# Patient Record
Sex: MALE | Race: WHITE | NOT HISPANIC OR LATINO | Employment: FULL TIME | URBAN - METROPOLITAN AREA
[De-identification: names, ages, dates, MRNs, and addresses within clinical notes are randomized per-mention and may not be internally consistent; named-entity substitution may affect disease eponyms.]

---

## 2022-07-05 ENCOUNTER — OFFICE VISIT (OUTPATIENT)
Dept: FAMILY MEDICINE | Facility: CLINIC | Age: 32
End: 2022-07-05
Payer: COMMERCIAL

## 2022-07-05 VITALS
BODY MASS INDEX: 25.94 KG/M2 | DIASTOLIC BLOOD PRESSURE: 72 MMHG | HEART RATE: 76 BPM | SYSTOLIC BLOOD PRESSURE: 118 MMHG | OXYGEN SATURATION: 98 % | TEMPERATURE: 99 F | HEIGHT: 66 IN | WEIGHT: 161.38 LBS

## 2022-07-05 DIAGNOSIS — L03.115 CELLULITIS OF RIGHT LOWER EXTREMITY: ICD-10-CM

## 2022-07-05 DIAGNOSIS — L03.116 CELLULITIS OF LEFT LOWER EXTREMITY: ICD-10-CM

## 2022-07-05 DIAGNOSIS — L25.5 CONTACT DERMATITIS DUE TO PLANT: Primary | ICD-10-CM

## 2022-07-05 PROCEDURE — 1159F PR MEDICATION LIST DOCUMENTED IN MEDICAL RECORD: ICD-10-PCS | Mod: CPTII,S$GLB,, | Performed by: FAMILY MEDICINE

## 2022-07-05 PROCEDURE — 3074F PR MOST RECENT SYSTOLIC BLOOD PRESSURE < 130 MM HG: ICD-10-PCS | Mod: CPTII,S$GLB,, | Performed by: FAMILY MEDICINE

## 2022-07-05 PROCEDURE — 1160F RVW MEDS BY RX/DR IN RCRD: CPT | Mod: CPTII,S$GLB,, | Performed by: FAMILY MEDICINE

## 2022-07-05 PROCEDURE — 3008F PR BODY MASS INDEX (BMI) DOCUMENTED: ICD-10-PCS | Mod: CPTII,S$GLB,, | Performed by: FAMILY MEDICINE

## 2022-07-05 PROCEDURE — 3008F BODY MASS INDEX DOCD: CPT | Mod: CPTII,S$GLB,, | Performed by: FAMILY MEDICINE

## 2022-07-05 PROCEDURE — 99203 OFFICE O/P NEW LOW 30 MIN: CPT | Mod: S$GLB,,, | Performed by: FAMILY MEDICINE

## 2022-07-05 PROCEDURE — 99999 PR PBB SHADOW E&M-NEW PATIENT-LVL III: ICD-10-PCS | Mod: PBBFAC,,, | Performed by: FAMILY MEDICINE

## 2022-07-05 PROCEDURE — 99999 PR PBB SHADOW E&M-NEW PATIENT-LVL III: CPT | Mod: PBBFAC,,, | Performed by: FAMILY MEDICINE

## 2022-07-05 PROCEDURE — 3078F DIAST BP <80 MM HG: CPT | Mod: CPTII,S$GLB,, | Performed by: FAMILY MEDICINE

## 2022-07-05 PROCEDURE — 1159F MED LIST DOCD IN RCRD: CPT | Mod: CPTII,S$GLB,, | Performed by: FAMILY MEDICINE

## 2022-07-05 PROCEDURE — 3074F SYST BP LT 130 MM HG: CPT | Mod: CPTII,S$GLB,, | Performed by: FAMILY MEDICINE

## 2022-07-05 PROCEDURE — 99203 PR OFFICE/OUTPT VISIT, NEW, LEVL III, 30-44 MIN: ICD-10-PCS | Mod: S$GLB,,, | Performed by: FAMILY MEDICINE

## 2022-07-05 PROCEDURE — 3078F PR MOST RECENT DIASTOLIC BLOOD PRESSURE < 80 MM HG: ICD-10-PCS | Mod: CPTII,S$GLB,, | Performed by: FAMILY MEDICINE

## 2022-07-05 PROCEDURE — 1160F PR REVIEW ALL MEDS BY PRESCRIBER/CLIN PHARMACIST DOCUMENTED: ICD-10-PCS | Mod: CPTII,S$GLB,, | Performed by: FAMILY MEDICINE

## 2022-07-05 RX ORDER — DOXYCYCLINE 100 MG/1
100 CAPSULE ORAL 2 TIMES DAILY
Qty: 20 CAPSULE | Refills: 0 | Status: SHIPPED | OUTPATIENT
Start: 2022-07-05 | End: 2023-08-08

## 2022-07-05 RX ORDER — METHYLPREDNISOLONE 4 MG/1
TABLET ORAL
Qty: 1 EACH | Refills: 0 | Status: SHIPPED | OUTPATIENT
Start: 2022-07-05 | End: 2023-08-08

## 2022-07-05 NOTE — PROGRESS NOTES
Subjective:       Patient ID: Grover Abbasi III is a 32 y.o. male.    Chief Complaint: No chief complaint on file.    New patient here for UC visit.  CC- infection on both lower legs.  2 weeks ago started w poison ivy; seemed to be improving and drying.  Approx 5 days ago he noticed more redness and it has become sore and thickened.   Heat exposure yesterday when riding 4 jones seemed to aggravate the soreness.  No fever or nausea.      PMSFHx unremarkable except for hearing loss in his mother who works here at [employer].    Non-smoker    Review of Systems   Constitutional: Negative for fever.   Respiratory: Negative for shortness of breath.    Cardiovascular: Negative for chest pain.   Gastrointestinal: Negative for abdominal pain and nausea.   Skin: Positive for rash.   Neurological: Negative for numbness.   All other systems reviewed and are negative.      Objective:      Physical Exam  Constitutional:       General: He is not in acute distress.     Appearance: He is well-developed.   Cardiovascular:      Rate and Rhythm: Normal rate and regular rhythm.      Heart sounds: No murmur heard.  Pulmonary:      Effort: Pulmonary effort is normal.      Breath sounds: Normal breath sounds.   Musculoskeletal:      Cervical back: Neck supple.   Lymphadenopathy:      Cervical: No cervical adenopathy.   Skin:     General: Skin is warm and dry.      Findings: Rash present.      Comments: Diffuse, patchy large areas of erythema on both lower legs with superimposed dry lesions c/w contact dermatitis.  Erythematous areas have some mild induration in some areas and increased warmth.           Assessment:       1. Contact dermatitis due to plant    2. Cellulitis of right lower extremity    3. Cellulitis of left lower extremity        Plan:       Contact dermatitis due to plant  -     methylPREDNISolone (MEDROL DOSEPACK) 4 mg tablet; use as directed  Dispense: 1 each; Refill: 0    Cellulitis of right lower extremity  -      doxycycline (MONODOX) 100 MG capsule; Take 1 capsule (100 mg total) by mouth 2 (two) times daily.  Dispense: 20 capsule; Refill: 0    Cellulitis of left lower extremity  -     doxycycline (MONODOX) 100 MG capsule; Take 1 capsule (100 mg total) by mouth 2 (two) times daily.  Dispense: 20 capsule; Refill: 0

## 2023-08-08 ENCOUNTER — OFFICE VISIT (OUTPATIENT)
Dept: FAMILY MEDICINE | Facility: CLINIC | Age: 33
End: 2023-08-08
Payer: COMMERCIAL

## 2023-08-08 VITALS
SYSTOLIC BLOOD PRESSURE: 110 MMHG | HEART RATE: 77 BPM | WEIGHT: 157.44 LBS | DIASTOLIC BLOOD PRESSURE: 78 MMHG | TEMPERATURE: 99 F | BODY MASS INDEX: 25.3 KG/M2 | HEIGHT: 66 IN | OXYGEN SATURATION: 98 %

## 2023-08-08 DIAGNOSIS — J32.9 SINUSITIS, UNSPECIFIED CHRONICITY, UNSPECIFIED LOCATION: Primary | ICD-10-CM

## 2023-08-08 DIAGNOSIS — T70.0XXA OTITIC BAROTRAUMA, INITIAL ENCOUNTER: ICD-10-CM

## 2023-08-08 PROCEDURE — 1159F PR MEDICATION LIST DOCUMENTED IN MEDICAL RECORD: ICD-10-PCS | Mod: CPTII,S$GLB,, | Performed by: NURSE PRACTITIONER

## 2023-08-08 PROCEDURE — 3008F BODY MASS INDEX DOCD: CPT | Mod: CPTII,S$GLB,, | Performed by: NURSE PRACTITIONER

## 2023-08-08 PROCEDURE — 3078F PR MOST RECENT DIASTOLIC BLOOD PRESSURE < 80 MM HG: ICD-10-PCS | Mod: CPTII,S$GLB,, | Performed by: NURSE PRACTITIONER

## 2023-08-08 PROCEDURE — 99999 PR PBB SHADOW E&M-EST. PATIENT-LVL IV: ICD-10-PCS | Mod: PBBFAC,,, | Performed by: NURSE PRACTITIONER

## 2023-08-08 PROCEDURE — 3074F SYST BP LT 130 MM HG: CPT | Mod: CPTII,S$GLB,, | Performed by: NURSE PRACTITIONER

## 2023-08-08 PROCEDURE — 3008F PR BODY MASS INDEX (BMI) DOCUMENTED: ICD-10-PCS | Mod: CPTII,S$GLB,, | Performed by: NURSE PRACTITIONER

## 2023-08-08 PROCEDURE — 3078F DIAST BP <80 MM HG: CPT | Mod: CPTII,S$GLB,, | Performed by: NURSE PRACTITIONER

## 2023-08-08 PROCEDURE — 1159F MED LIST DOCD IN RCRD: CPT | Mod: CPTII,S$GLB,, | Performed by: NURSE PRACTITIONER

## 2023-08-08 PROCEDURE — 1160F PR REVIEW ALL MEDS BY PRESCRIBER/CLIN PHARMACIST DOCUMENTED: ICD-10-PCS | Mod: CPTII,S$GLB,, | Performed by: NURSE PRACTITIONER

## 2023-08-08 PROCEDURE — 3074F PR MOST RECENT SYSTOLIC BLOOD PRESSURE < 130 MM HG: ICD-10-PCS | Mod: CPTII,S$GLB,, | Performed by: NURSE PRACTITIONER

## 2023-08-08 PROCEDURE — 87635 SARS-COV-2 COVID-19 AMP PRB: CPT | Performed by: NURSE PRACTITIONER

## 2023-08-08 PROCEDURE — 99213 OFFICE O/P EST LOW 20 MIN: CPT | Mod: S$GLB,,, | Performed by: NURSE PRACTITIONER

## 2023-08-08 PROCEDURE — 99999 PR PBB SHADOW E&M-EST. PATIENT-LVL IV: CPT | Mod: PBBFAC,,, | Performed by: NURSE PRACTITIONER

## 2023-08-08 PROCEDURE — 99213 PR OFFICE/OUTPT VISIT, EST, LEVL III, 20-29 MIN: ICD-10-PCS | Mod: S$GLB,,, | Performed by: NURSE PRACTITIONER

## 2023-08-08 PROCEDURE — 1160F RVW MEDS BY RX/DR IN RCRD: CPT | Mod: CPTII,S$GLB,, | Performed by: NURSE PRACTITIONER

## 2023-08-08 RX ORDER — LORATADINE 10 MG/1
10 TABLET ORAL DAILY PRN
Qty: 30 TABLET | Refills: 1 | Status: SHIPPED | OUTPATIENT
Start: 2023-08-08 | End: 2024-03-12

## 2023-08-08 RX ORDER — METHYLPREDNISOLONE 4 MG/1
TABLET ORAL
Qty: 21 EACH | Refills: 0 | Status: SHIPPED | OUTPATIENT
Start: 2023-08-08 | End: 2023-08-29

## 2023-08-08 RX ORDER — DOXYCYCLINE 100 MG/1
100 CAPSULE ORAL 2 TIMES DAILY
Qty: 14 CAPSULE | Refills: 0 | Status: SHIPPED | OUTPATIENT
Start: 2023-08-08 | End: 2023-08-15

## 2023-08-08 NOTE — PROGRESS NOTES
"Subjective:       Patient ID: Grover Abbasi III is a 33 y.o. male.    Chief Complaint: Sinus Problem and pain in ears     HPI   34 y/o male patient with medical problems listed below presents for lingering sinus symptoms of sinus pressure, nasal congestion, clear rhinorrhea, postnasal dripping since last Friday. States he flew to [place] and returned back to [place] this Sunday. He also states has had b/l ear pressure but left ear pressure resolved on landing but still has constant right ear pressure but denies ear pain or discharges, fever, chills. Denies cough, sore throat, chest pain, sob, nausea, abdominal pain, generalized body ache.     There is no problem list on file for this patient.       Review of patient's allergies indicates:   Allergen Reactions    Amoxicillin        No past surgical history on file.       Current Outpatient Medications:     doxycycline (VIBRAMYCIN) 100 MG Cap, Take 1 capsule (100 mg total) by mouth 2 (two) times daily. for 7 days, Disp: 14 capsule, Rfl: 0    loratadine (CLARITIN) 10 mg tablet, Take 1 tablet (10 mg total) by mouth daily as needed for Allergies., Disp: 30 tablet, Rfl: 1    methylPREDNISolone (MEDROL DOSEPACK) 4 mg tablet, use as directed, Disp: 21 each, Rfl: 0    No results found for: "WBC", "HGB", "HCT", "PLT", "CHOL", "TRIG", "HDL", "LDLDIRECT", "ALT", "AST", "NA", "K", "CL", "CREATININE", "BUN", "CO2", "TSH", "PSA", "INR", "GLUF", "HGBA1C", "MICROALBUR"    Review of Systems   Constitutional:  Negative for chills and fever.   HENT:  Positive for congestion, postnasal drip, rhinorrhea and sinus pressure. Negative for ear discharge and sore throat.    Respiratory:  Negative for cough, chest tightness and shortness of breath.    Cardiovascular:  Negative for chest pain and palpitations.   Gastrointestinal:  Negative for abdominal pain.   Neurological:  Negative for dizziness and headaches.       Objective:   /78 (BP Location: Right arm, Patient Position: Sitting, BP " "Method: Medium (Manual))   Pulse 77   Temp 98.9 °F (37.2 °C) (Oral)   Ht 5' 6" (1.676 m)   Wt 71.4 kg (157 lb 6.5 oz)   SpO2 98%   BMI 25.41 kg/m²         Physical Exam  Vitals reviewed.   Constitutional:       General: He is not in acute distress.     Appearance: Normal appearance.   HENT:      Head:      Comments: +tenderness in b/l maxillary sinus      Right Ear: Tympanic membrane normal.      Left Ear: Tympanic membrane normal.      Nose: Congestion and rhinorrhea present.   Cardiovascular:      Rate and Rhythm: Normal rate and regular rhythm.      Pulses: Normal pulses.      Heart sounds: Normal heart sounds.   Chest:      Chest wall: No deformity, swelling, tenderness or edema.   Abdominal:      General: Abdomen is flat. Bowel sounds are normal.      Palpations: Abdomen is soft.   Musculoskeletal:      Cervical back: Normal range of motion.   Skin:     General: Skin is warm and dry.   Neurological:      General: No focal deficit present.      Mental Status: He is alert.   Psychiatric:         Mood and Affect: Mood normal.         Behavior: Behavior normal.         Assessment:       1. Sinusitis, unspecified chronicity, unspecified location    2. Otitic barotrauma, initial encounter        Plan:       1. Sinusitis, unspecified chronicity, unspecified location  - COVID-19 Routine Screening  - Patient is allergic to Penicillin  - doxycycline (VIBRAMYCIN) 100 MG Cap; Take 1 capsule (100 mg total) by mouth 2 (two) times daily. for 7 days  Dispense: 14 capsule; Refill: 0  - methylPREDNISolone (MEDROL DOSEPACK) 4 mg tablet; use as directed  Dispense: 21 each; Refill: 0    2. Otitic barotrauma, initial encounter  - loratadine (CLARITIN) 10 mg tablet; Take 1 tablet (10 mg total) by mouth daily as needed for Allergies.  Dispense: 30 tablet; Refill: 1  - Advised to see ENT if no improvement or worsening   - Ambulatory referral/consult to ENT; Future    Patient with be reevaluated in  as needed  or sooner sean Orourke " NP

## 2023-08-09 LAB — SARS-COV-2 RNA RESP QL NAA+PROBE: DETECTED

## 2023-09-07 ENCOUNTER — TELEPHONE (OUTPATIENT)
Dept: FAMILY MEDICINE | Facility: CLINIC | Age: 33
End: 2023-09-07
Payer: COMMERCIAL

## 2023-10-27 ENCOUNTER — OFFICE VISIT (OUTPATIENT)
Dept: URGENT CARE | Facility: CLINIC | Age: 33
End: 2023-10-27
Payer: OTHER MISCELLANEOUS

## 2023-10-27 VITALS
WEIGHT: 155 LBS | HEIGHT: 65 IN | OXYGEN SATURATION: 98 % | TEMPERATURE: 99 F | DIASTOLIC BLOOD PRESSURE: 78 MMHG | BODY MASS INDEX: 25.83 KG/M2 | HEART RATE: 92 BPM | SYSTOLIC BLOOD PRESSURE: 128 MMHG | RESPIRATION RATE: 18 BRPM

## 2023-10-27 DIAGNOSIS — S39.012A LUMBAR SPINE STRAIN, INITIAL ENCOUNTER: Primary | ICD-10-CM

## 2023-10-27 DIAGNOSIS — Z02.6 ENCOUNTER RELATED TO WORKER'S COMPENSATION CLAIM: ICD-10-CM

## 2023-10-27 LAB
CTP QC/QA: YES
POC 10 PANEL DRUG SCREEN: NEGATIVE

## 2023-10-27 PROCEDURE — 99203 PR OFFICE/OUTPT VISIT, NEW, LEVL III, 30-44 MIN: ICD-10-PCS | Mod: S$GLB,,,

## 2023-10-27 PROCEDURE — 80305 DRUG TEST PRSMV DIR OPT OBS: CPT | Mod: S$GLB,,,

## 2023-10-27 PROCEDURE — 72110 X-RAY EXAM L-2 SPINE 4/>VWS: CPT | Mod: S$GLB,,, | Performed by: RADIOLOGY

## 2023-10-27 PROCEDURE — 72110 XR LUMBAR SPINE COMPLETE 5 VIEW: ICD-10-PCS | Mod: S$GLB,,, | Performed by: RADIOLOGY

## 2023-10-27 PROCEDURE — 80305 POCT RAPID DRUG SCREEN 10 PANEL: ICD-10-PCS | Mod: S$GLB,,,

## 2023-10-27 PROCEDURE — 99203 OFFICE O/P NEW LOW 30 MIN: CPT | Mod: S$GLB,,,

## 2023-10-27 RX ORDER — IBUPROFEN 800 MG/1
800 TABLET ORAL 3 TIMES DAILY
Qty: 30 TABLET | Refills: 1 | Status: SHIPPED | OUTPATIENT
Start: 2023-10-27 | End: 2023-11-09

## 2023-10-27 NOTE — LETTER
[facility]  [address] [address]  Phone: [phone]  Fax: [phone]  [facility]: [id number]    Pt Name: Grover Abbasi III  Injury Date: 10/27/2023   Employee ID: [id number] Date of First Treatment: 10/27/2023   Company: [employer]      Appointment Time:  Arrived: 1:24 PM    Provider: Fan Moss DO Time Out:3:05 PM      Office Treatment:   1. Lumbar spine strain, initial encounter    2. Encounter related to worker's compensation claim      Medications Ordered This Encounter   Medications    ibuprofen (ADVIL,MOTRIN) 800 MG tablet        Patient Instructions: Daily home exercises/warm soaks      Restrictions: No lifting/pushing/pulling more than 10 lbs, Avoid frequent bending/lifting/twisting     Return Appointment: 10/31/2023 at 2:00 PM LO

## 2023-10-27 NOTE — PROGRESS NOTES
"Subjective:      Patient ID: Grover Abbasi III is a 33 y.o. male.    Chief Complaint: Back Pain    See MA note. Grover Livingston" is an  for the company. Roshan and a co-worker were using a jeannette to move a compressor up a flight of stairs.  He was walking sideways up the stairs  (leading up each step with left foot/leg) while arms were towards his right pulling on the jeannette handle. The co-worker was pushing at the jeannette base.  The co-worker suddenly lost grasp of the jeannette which pulled Roshan's body down and to the right.  He immediately felt pain to his right lower back that radiated to the right buttock area.  Incident occurred approximately 2 hours ago he feels intermittent shooting pains to the mentioned areas.  He denies any pre-existing issues to his lower back.  He finds some temporary reduction pain as he repositions himself but pain ultimately returns.  Otherwise no radicular symptoms.    Patient's place of employment - [employer] [place]  Patient's job title -   Date of injury - 10-27-23  Body part injured including left or right - Back  Injury Mechanism - Lifting  What they were doing when they got hurt - Lifting  AC parts  What they did immediately after - Reported  Pain scale right now - 8/10    Back Pain        Constitution: Negative.   HENT: Negative.     Neck: neck negative.   Cardiovascular: Negative.    Eyes: Negative.    Respiratory: Negative.     Gastrointestinal: Negative.    Endocrine: negative.   Genitourinary: Negative.    Musculoskeletal:  Positive for pain, joint pain, back pain, pain with walking and muscle ache. Negative for trauma and joint swelling.   Skin: Negative.    Neurological: Negative.      Objective:     Physical Exam  Vitals and nursing note reviewed.   Constitutional:       General: He is not in acute distress.     Appearance: Normal appearance.   HENT:      Head: Normocephalic.   Eyes:      General: Lids are normal.      Conjunctiva/sclera: " Conjunctivae normal.   Musculoskeletal:      Cervical back: No pain with movement.      Lumbar back: Tenderness present. No swelling or deformity. Decreased range of motion. Negative right straight leg raise test and negative left straight leg raise test.        Back:       Comments: No gross deformity noted to the lumbar spine.  No overlying skin changes such as swelling, erythema, or bruising noted.  Tenderness on palpation to the L4-L5 area midline and just to the right.  Pain to same area with extension and right rotation greater than flexion and side bending.  Decreased range of motion left greater than right side-bending and with flexion.  During flexion activity there was slight elevation of the right side of his upper back which could be suggestive of scoliosis.  Normal heel and toe walking.  He is able to stand up from a seated position and climb on and off the examination table albeit slowly.  He has symmetrical strength and reflexes to lower extremities bilaterally.  Gait is normal.   Skin:     General: Skin is warm.      Capillary Refill: Capillary refill takes less than 2 seconds.   Neurological:      General: No focal deficit present.      Mental Status: He is alert and oriented to person, place, and time.      Deep Tendon Reflexes: Reflexes are normal and symmetric.   Psychiatric:         Attention and Perception: Attention normal.         Mood and Affect: Mood and affect normal.         Speech: Speech normal.         Behavior: Behavior normal. Behavior is cooperative.        Assessment:      1. Lumbar spine strain, initial encounter    2. Encounter related to worker's compensation claim      Plan:   I personally reviewed lumbar radiographs.  No acute findings noted lumbar lordosis appears to be maintained.  Pending radiologist's final interpretation.    History and clinical examination is suggestive of lumbar strain.  I will treat him conservatively with work limitations and prescription strength  ibuprofen.  He may complement the anti-inflammatory with over-the-counter Tylenol as directed on label.  Today was accompanied by his  he was in agreement with the treatment plan including work limitations.  He was advised to use heat and or stretches under hot shower to see if this will help reduce his lower back symptoms.  I will have him follow up in approximately 4 days to re-evaluate his status.    I spent a total of 30 minutes on the day of the visit.This includes face to face time and non-face to face time preparing to see the patient (eg, review of tests), obtaining and/or reviewing separately obtained history, documenting clinical information in the electronic or other health record, independently interpreting results and communicating results to the patient/family/caregiver, or care coordinator.     Medications Ordered This Encounter   Medications    ibuprofen (ADVIL,MOTRIN) 800 MG tablet     Sig: Take 1 tablet (800 mg total) by mouth 3 (three) times daily.     Dispense:  30 tablet     Refill:  1     Patient Instructions: Daily home exercises/warm soaks   Restrictions: No lifting/pushing/pulling more than 10 lbs, Avoid frequent bending/lifting/twisting  Follow up in about 4 days (around 10/31/2023).

## 2023-10-31 ENCOUNTER — OFFICE VISIT (OUTPATIENT)
Dept: URGENT CARE | Facility: CLINIC | Age: 33
End: 2023-10-31
Payer: OTHER MISCELLANEOUS

## 2023-10-31 VITALS
TEMPERATURE: 98 F | RESPIRATION RATE: 16 BRPM | OXYGEN SATURATION: 97 % | HEART RATE: 96 BPM | BODY MASS INDEX: 24.91 KG/M2 | HEIGHT: 66 IN | WEIGHT: 155 LBS | DIASTOLIC BLOOD PRESSURE: 86 MMHG | SYSTOLIC BLOOD PRESSURE: 138 MMHG

## 2023-10-31 DIAGNOSIS — Z02.6 ENCOUNTER RELATED TO WORKER'S COMPENSATION CLAIM: Primary | ICD-10-CM

## 2023-10-31 DIAGNOSIS — M54.41 ACUTE BILATERAL LOW BACK PAIN WITH RIGHT-SIDED SCIATICA: ICD-10-CM

## 2023-10-31 DIAGNOSIS — S39.012D LUMBAR SPINE STRAIN, SUBSEQUENT ENCOUNTER: ICD-10-CM

## 2023-10-31 PROCEDURE — 99213 PR OFFICE/OUTPT VISIT, EST, LEVL III, 20-29 MIN: ICD-10-PCS | Mod: S$GLB,,, | Performed by: SURGERY

## 2023-10-31 PROCEDURE — 99213 OFFICE O/P EST LOW 20 MIN: CPT | Mod: S$GLB,,, | Performed by: SURGERY

## 2023-10-31 RX ORDER — METHOCARBAMOL 750 MG/1
750 TABLET, FILM COATED ORAL 3 TIMES DAILY
Qty: 30 TABLET | Refills: 0 | Status: SHIPPED | OUTPATIENT
Start: 2023-10-31 | End: 2023-11-10

## 2023-10-31 NOTE — LETTER
[facility]  [address] [address]  Phone: [phone]  Fax: [phone]  [facility]: [id number]    Pt Name: Grover Abbasi III  Injury Date: 10/27/2023   Employee ID: [id number] Date of Treatment: 10/31/2023   Company: [employer]      Appointment Time: 02:00 PM Arrived: 1:45 PM    Provider: Maria Del Carmen Billingsley MD Time Out:2:41 PM      Office Treatment:   1. Encounter related to worker's compensation claim    2. Lumbar spine strain, subsequent encounter    3. Acute bilateral low back pain with right-sided sciatica      Medications Ordered This Encounter   Medications    methocarbamoL (ROBAXIN) 750 MG Tab        Patient Instructions: Daily home exercises/warm soaks      Restrictions: No lifting/pushing/pulling more than 10 lbs, Avoid frequent bending/lifting/twisting     Return Appointment: 11/3/23 at 9:30 AM Hum[facility] VALDERRAMA

## 2023-10-31 NOTE — PROGRESS NOTES
Subjective:      Patient ID: Grover Abbasi III is a 33 y.o. male.    Chief Complaint: Back Pain (Low back into right leg)    Patient's place of employment - [facility] [employer] [employer]   Patient's job title - HVAC Tech  Date of Injury - 10/27/23  Body part injured - Low back   Current work status per last visit - Light  Improved, same, or worse - Same  Pain Scale right now (1-10) -  7    Back Pain  Associated symptoms include numbness.       Constitution: Negative.   HENT: Negative.     Neck: neck negative.   Cardiovascular: Negative.    Eyes: Negative.    Respiratory: Negative.     Gastrointestinal: Negative.    Endocrine: negative.   Genitourinary: Negative.    Musculoskeletal:  Positive for pain, joint pain, abnormal ROM of joint, back pain and pain with walking. Negative for trauma, joint swelling, muscle cramps and muscle ache.   Skin: Negative.  Negative for erythema and bruising.   Neurological:  Positive for numbness and tingling.       See MA note. Begin MD note:    Grover Abbasi III is a 33 y.o. male presenting for follow-up of back pain. Pain is unchanged from prior, used the prescribed ibuprofen once of twice but does not feel it did much to address his pain. He has noticed that with prolonged sitting [place] that he gets pain and numbness that radiates down the outside of his right leg and stops at his ankle. He was unable to sit on the floor and play with his child due to immense discomfort. Pain is worse with bending forward. He has been working light duty without any issues.     Objective:     Physical Exam  Vitals and nursing note reviewed.   HENT:      Head: Normocephalic.   Eyes:      Conjunctiva/sclera: Conjunctivae normal.   Pulmonary:      Effort: Pulmonary effort is normal.   Musculoskeletal:      Lumbar back: Spasms and tenderness present. Decreased range of motion.        Back:       Comments: Decreased FF, FAROM in all other planes with report of right sided pain  with oblique extension.    Skin:     Findings: No erythema.   Neurological:      General: No focal deficit present.      Mental Status: He is alert and oriented to person, place, and time.      Motor: Motor function is intact.      Coordination: Coordination is intact.   Psychiatric:         Attention and Perception: Attention normal.         Mood and Affect: Mood normal.         Speech: Speech normal.         Behavior: Behavior normal. Behavior is cooperative.         Thought Content: Thought content normal.        Assessment:      1. Encounter related to worker's compensation claim    2. Lumbar spine strain, subsequent encounter    3. Acute bilateral low back pain with right-sided sciatica      Plan:     I personally reviewed prior clinic note and imaging related to this injury. No improvements in pain symptoms from prior, continued deficit in ROM of lumbar spine. Advised to take prescribed medications TID consistently for the next two days prior to re-evaluation on Friday. [place] the [place], so will follow-up in [facility].    Diagnoses and plan discussed with the patient, as well as the expected course and duration of symptoms. Risks and benefits of any medication prescribed during this visit was explained, verbal instructions on use given. Clinic/Emergency department return precautions were given, can return to [facility] before scheduled follow-up appointment if notes worsening/aggravation of symptoms. All questions and concerns were addressed prior to discharge. Plan was developed with active input from the patient and they verbalized understanding of and agreement with the POC.   Note was dictated with voice recognition software, please excuse any grammatical errors.    Medications Ordered This Encounter   Medications    methocarbamoL (ROBAXIN) 750 MG Tab     Sig: Take 1 tablet (750 mg total) by mouth 3 (three) times daily. for 10 days     Dispense:  30 tablet     Refill:  0     Patient  Instructions: Daily home exercises/warm soaks   Restrictions: No lifting/pushing/pulling more than 10 lbs, Avoid frequent bending/lifting/twisting  Follow up in about 3 days (around 11/3/2023) for [place].

## 2023-11-03 ENCOUNTER — OFFICE VISIT (OUTPATIENT)
Dept: URGENT CARE | Facility: CLINIC | Age: 33
End: 2023-11-03
Payer: OTHER MISCELLANEOUS

## 2023-11-03 VITALS
RESPIRATION RATE: 16 BRPM | DIASTOLIC BLOOD PRESSURE: 84 MMHG | SYSTOLIC BLOOD PRESSURE: 126 MMHG | HEART RATE: 53 BPM | OXYGEN SATURATION: 100 %

## 2023-11-03 DIAGNOSIS — S39.012D LUMBAR SPINE STRAIN, SUBSEQUENT ENCOUNTER: ICD-10-CM

## 2023-11-03 DIAGNOSIS — Z02.6 ENCOUNTER RELATED TO WORKER'S COMPENSATION CLAIM: Primary | ICD-10-CM

## 2023-11-03 DIAGNOSIS — M54.41 ACUTE BILATERAL LOW BACK PAIN WITH RIGHT-SIDED SCIATICA: ICD-10-CM

## 2023-11-03 PROCEDURE — 96372 THER/PROPH/DIAG INJ SC/IM: CPT | Mod: S$GLB,,, | Performed by: FAMILY MEDICINE

## 2023-11-03 PROCEDURE — 96372 PR INJECTION,THERAP/PROPH/DIAG2ST, IM OR SUBCUT: ICD-10-PCS | Mod: S$GLB,,, | Performed by: FAMILY MEDICINE

## 2023-11-03 PROCEDURE — 99213 OFFICE O/P EST LOW 20 MIN: CPT | Mod: 25,S$GLB,, | Performed by: FAMILY MEDICINE

## 2023-11-03 PROCEDURE — 99213 PR OFFICE/OUTPT VISIT, EST, LEVL III, 20-29 MIN: ICD-10-PCS | Mod: 25,S$GLB,, | Performed by: FAMILY MEDICINE

## 2023-11-03 RX ORDER — KETOROLAC TROMETHAMINE 30 MG/ML
30 INJECTION, SOLUTION INTRAMUSCULAR; INTRAVENOUS
Status: COMPLETED | OUTPATIENT
Start: 2023-11-03 | End: 2023-11-03

## 2023-11-03 RX ORDER — ETODOLAC 400 MG/1
400 TABLET, FILM COATED ORAL 2 TIMES DAILY
Qty: 60 TABLET | Refills: 0 | Status: SHIPPED | OUTPATIENT
Start: 2023-11-03 | End: 2024-03-12

## 2023-11-03 RX ORDER — LIDOCAINE 560 MG/1
PATCH PERCUTANEOUS; TOPICAL; TRANSDERMAL
Qty: 15 PATCH | Refills: 1 | Status: SHIPPED | OUTPATIENT
Start: 2023-11-03 | End: 2024-03-12

## 2023-11-03 RX ORDER — METHYLPREDNISOLONE 4 MG/1
TABLET ORAL
Qty: 21 EACH | Refills: 0 | Status: SHIPPED | OUTPATIENT
Start: 2023-11-03 | End: 2023-11-09 | Stop reason: ALTCHOICE

## 2023-11-03 RX ADMIN — KETOROLAC TROMETHAMINE 30 MG: 30 INJECTION, SOLUTION INTRAMUSCULAR; INTRAVENOUS at 10:11

## 2023-11-03 NOTE — PROGRESS NOTES
Subjective:      Patient ID: Grover Abbasi III is a 33 y.o. male.    Chief Complaint: Back Pain    WC Initial Visit.  Pt works for [employer] .  Injury to low back while lifting AC parts     Pt states he is doing about the same.  Given Robaxin at last visit which helped.  Still c/o random shooting pains, with pains down right leg with sitting    Pain 4/10 with spikes 8/10       WC Visit 10/27/2023   Patient's place of employment - [employer]    Date of injury - 10-27-23  Body part injured including left or right - Back, injured while lifting AC parts  What they did immediately after - Reported     Pain 8/10       Back Pain  This is a new problem. The current episode started in the past 7 days. The problem occurs intermittently. The problem is unchanged. The pain is present in the lumbar spine. The quality of the pain is described as aching and shooting. The pain radiates to the right thigh. The pain is at a severity of 4/10. The pain is mild. The symptoms are aggravated by sitting. Associated symptoms include leg pain. Pertinent negatives include no numbness or tingling. He has tried heat (robaxin) for the symptoms. The treatment provided moderate relief.       Musculoskeletal:  Positive for back pain.   Neurological:  Negative for numbness.     Objective:     Physical Exam  Musculoskeletal:         General: Tenderness present.      Lumbar back: Spasms and tenderness present.        Back:       Comments: Neg slump test.   Pain on right with left SLR  Inc pain on right with right SLR     Negative REFUGIO bilat     Assessment:      1. Encounter related to worker's compensation claim    2. Lumbar spine strain, subsequent encounter    3. Acute bilateral low back pain with right-sided sciatica      Plan:   Stop ibuprofen. Start lodine  ROM exercises stretches demonstrated in clinic,  PT if no improvement.     Medications Ordered This Encounter   Medications    etodolac (LODINE) 400  MG tablet     Sig: Take 1 tablet (400 mg total) by mouth 2 (two) times daily.     Dispense:  60 tablet     Refill:  0    ketorolac injection 30 mg    LIDOcaine 4 % PtMd     Sig: Apply 1 patch topically to affected area 2 x per day     Dispense:  15 patch     Refill:  1    methylPREDNISolone (MEDROL DOSEPACK) 4 mg tablet     Sig: use as directed     Dispense:  21 each     Refill:  0     Patient Instructions: Daily home exercises/warm soaks, Attention not to aggravate affected area   Restrictions:  (continue current work status. advance as tolerated.)  No follow-ups on file.

## 2023-11-03 NOTE — LETTER
[facility] [place] [address], [address]  [facility] [address]  Phone: [phone]  Fax: [phone]  [facility]: [email or web address]    Pt Name: Grover Abbasi III  Injury Date: 10/27/2023   Employee ID: [id number] Date of Treatment: 11/03/2023   Company: [employer]      Appointment Time: 09:15 AM Arrived: 931 am   Provider: David Hammer MD Time Out:1035 am     Office Treatment:   1. Encounter related to worker's compensation claim    2. Lumbar spine strain, subsequent encounter    3. Acute bilateral low back pain with right-sided sciatica      Medications Ordered This Encounter   Medications    etodolac (LODINE) 400 MG tablet    ketorolac injection 30 mg    LIDOcaine 4 % PtMd    methylPREDNISolone (MEDROL DOSEPACK) 4 mg tablet      Patient Instructions: Daily home exercises/warm soaks, Attention not to aggravate affected area    Restrictions:  (continue current work status. advance as tolerated.)     Return Appointment: 11/9/2023

## 2023-11-09 ENCOUNTER — OFFICE VISIT (OUTPATIENT)
Dept: URGENT CARE | Facility: CLINIC | Age: 33
End: 2023-11-09
Payer: OTHER MISCELLANEOUS

## 2023-11-09 VITALS
DIASTOLIC BLOOD PRESSURE: 74 MMHG | SYSTOLIC BLOOD PRESSURE: 124 MMHG | HEIGHT: 66 IN | TEMPERATURE: 99 F | WEIGHT: 155 LBS | BODY MASS INDEX: 24.91 KG/M2 | RESPIRATION RATE: 18 BRPM | HEART RATE: 70 BPM | OXYGEN SATURATION: 97 %

## 2023-11-09 DIAGNOSIS — S39.012D LUMBAR SPINE STRAIN, SUBSEQUENT ENCOUNTER: ICD-10-CM

## 2023-11-09 DIAGNOSIS — Z02.6 ENCOUNTER RELATED TO WORKER'S COMPENSATION CLAIM: Primary | ICD-10-CM

## 2023-11-09 DIAGNOSIS — M54.41 ACUTE BILATERAL LOW BACK PAIN WITH RIGHT-SIDED SCIATICA: ICD-10-CM

## 2023-11-09 PROCEDURE — 99213 OFFICE O/P EST LOW 20 MIN: CPT | Mod: S$GLB,,, | Performed by: SURGERY

## 2023-11-09 PROCEDURE — 99213 PR OFFICE/OUTPT VISIT, EST, LEVL III, 20-29 MIN: ICD-10-PCS | Mod: S$GLB,,, | Performed by: SURGERY

## 2023-11-09 NOTE — LETTER
[facility]  [address] [address]  Phone: [phone]  Fax: [phone]  [facility]: [id number]    Pt Name: Grover Abbasi III  Injury Date: 10/27/2023   Employee ID: [id number] Date of Treatment: 11/09/2023   Company:[employer]      Appointment Time: 02:00 PM Arrived: 1:30 PM    Provider: Maria Del Carmen Billingsley MD Time Out:2:36 PM      Office Treatment:   1. Encounter related to worker's compensation claim    2. Lumbar spine strain, subsequent encounter    3. Acute bilateral low back pain with right-sided sciatica          Patient Instructions: PT to be scheduled once authorized, Begin Physical Therapy, Daily home exercises/warm soaks      Restrictions: Regular Duty, No lifting/pushing/pulling more than 25 lbs, Avoid frequent bending/lifting/twisting     Return Appointment: 11/30/2023 at 2:00 PM LO

## 2023-11-09 NOTE — PROGRESS NOTES
Subjective:      Patient ID: Grover Abbasi III is a 33 y.o. male.    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 Negative for generalized weakness.   Neck: Negative for neck pain and neck stiffness.   Musculoskeletal:  Positive for pain, abnormal ROM of joint, back pain and pain with walking. Negative for muscle cramps and muscle ache.   Neurological:  Positive for numbness and tingling. Negative for loss of balance.   Psychiatric/Behavioral:  Negative for sleep disturbance.        See MA note above. Begin MD note:    Grover Abbasi III is a 33 y.o. male presenting for follow-up of low back pain. Has noted some global improvement since last OV, but had increased pain with stretching performed during visit. Had immense relief with toradol shot. Did not notice any interval improvements with use of medrol dosepack. Continues to have radicular symptoms with prolonged sitting greater than 15 minutes, this is an improvement from prior. Does not notice much improvement with use of muscle relaxer but continues to take 3x daily. Also taking NSAID regularly. He is fine at work so long as he avoids bending forward.     Objective:     Physical Exam  Vitals and nursing note reviewed.   HENT:      Head: Normocephalic.   Eyes:      Conjunctiva/sclera: Conjunctivae normal.   Pulmonary:      Effort: Pulmonary effort is normal.   Musculoskeletal:      Lumbar back: Spasms and tenderness present. Decreased range of motion.        Back:       Comments: Able to rise from sitting without difficulty or assistance. Decreased FF, FAROM in all other planes with report of right sided pain with lateral flexion bilaterally.    Skin:      Findings: No erythema.   Neurological:      General: No focal deficit present.      Mental Status: He is alert and oriented to person, place, and time.      Motor: Motor function is intact.      Coordination: Coordination is intact.   Psychiatric:         Attention and Perception: Attention normal.         Mood and Affect: Mood normal.         Speech: Speech normal.         Behavior: Behavior normal. Behavior is cooperative.         Thought Content: Thought content normal.    Assessment:      1. Encounter related to worker's compensation claim    2. Lumbar spine strain, subsequent encounter    3. Acute bilateral low back pain with right-sided sciatica      Plan:     He is now 3 weeks out from [facility], has had very minor improvements with mediations. He has continued deficits and would benefit from non-pharmacologic modalities available to PT. Plan to follow up in 3 weeks after he has had at least PT intake. Work restrictions given.     Diagnoses and plan discussed with the patient, as well as the expected course and duration of symptoms. Risks and benefits of any medication prescribed during this visit was explained, verbal instructions on use given. Clinic/Emergency department return precautions were given, can return to [facility] before scheduled follow-up appointment if notes worsening/aggravation of symptoms. All questions and concerns were addressed prior to discharge. Plan was developed with active input from the patient and they verbalized understanding of and agreement with the POC.   Note was dictated with voice recognition software, please excuse any grammatical errors.     Patient Instructions: PT to be scheduled once authorized, Begin Physical Therapy, Daily home exercises/warm soaks   Restrictions: Regular Duty, No lifting/pushing/pulling more than 25 lbs, Avoid frequent bending/lifting/twisting  Follow up in about 3 weeks (around 11/30/2023).

## 2023-11-17 ENCOUNTER — OFFICE VISIT (OUTPATIENT)
Dept: FAMILY MEDICINE | Facility: CLINIC | Age: 33
End: 2023-11-17
Payer: COMMERCIAL

## 2023-11-17 VITALS
WEIGHT: 153.31 LBS | DIASTOLIC BLOOD PRESSURE: 84 MMHG | HEIGHT: 66 IN | BODY MASS INDEX: 24.64 KG/M2 | SYSTOLIC BLOOD PRESSURE: 130 MMHG

## 2023-11-17 DIAGNOSIS — L71.9 ROSACEA: ICD-10-CM

## 2023-11-17 DIAGNOSIS — Z23 NEED FOR TDAP VACCINATION: ICD-10-CM

## 2023-11-17 DIAGNOSIS — Z13.1 ENCOUNTER FOR SCREENING FOR DIABETES MELLITUS: ICD-10-CM

## 2023-11-17 DIAGNOSIS — Z13.220 ENCOUNTER FOR SCREENING FOR LIPID DISORDER: ICD-10-CM

## 2023-11-17 DIAGNOSIS — Z11.59 ENCOUNTER FOR HEPATITIS C SCREENING TEST FOR LOW RISK PATIENT: ICD-10-CM

## 2023-11-17 DIAGNOSIS — Z30.09 VASECTOMY EVALUATION: ICD-10-CM

## 2023-11-17 DIAGNOSIS — Z00.00 PREVENTATIVE HEALTH CARE: Primary | ICD-10-CM

## 2023-11-17 DIAGNOSIS — Z11.4 ENCOUNTER FOR SCREENING FOR HUMAN IMMUNODEFICIENCY VIRUS (HIV): ICD-10-CM

## 2023-11-17 PROCEDURE — 3008F BODY MASS INDEX DOCD: CPT | Mod: CPTII,S$GLB,, | Performed by: STUDENT IN AN ORGANIZED HEALTH CARE EDUCATION/TRAINING PROGRAM

## 2023-11-17 PROCEDURE — 3075F SYST BP GE 130 - 139MM HG: CPT | Mod: CPTII,S$GLB,, | Performed by: STUDENT IN AN ORGANIZED HEALTH CARE EDUCATION/TRAINING PROGRAM

## 2023-11-17 PROCEDURE — 3079F PR MOST RECENT DIASTOLIC BLOOD PRESSURE 80-89 MM HG: ICD-10-PCS | Mod: CPTII,S$GLB,, | Performed by: STUDENT IN AN ORGANIZED HEALTH CARE EDUCATION/TRAINING PROGRAM

## 2023-11-17 PROCEDURE — 99395 PREV VISIT EST AGE 18-39: CPT | Mod: 1E,GY,25,S$GLB | Performed by: STUDENT IN AN ORGANIZED HEALTH CARE EDUCATION/TRAINING PROGRAM

## 2023-11-17 PROCEDURE — 99395 PR PREVENTIVE VISIT,EST,18-39: ICD-10-PCS | Mod: 1E,GY,25,S$GLB | Performed by: STUDENT IN AN ORGANIZED HEALTH CARE EDUCATION/TRAINING PROGRAM

## 2023-11-17 PROCEDURE — 99999 PR PBB SHADOW E&M-EST. PATIENT-LVL IV: CPT | Mod: PBBFAC,,, | Performed by: STUDENT IN AN ORGANIZED HEALTH CARE EDUCATION/TRAINING PROGRAM

## 2023-11-17 PROCEDURE — 99213 OFFICE O/P EST LOW 20 MIN: CPT | Mod: 25,S$GLB,, | Performed by: STUDENT IN AN ORGANIZED HEALTH CARE EDUCATION/TRAINING PROGRAM

## 2023-11-17 PROCEDURE — 90471 IMMUNIZATION ADMIN: CPT | Mod: S$GLB,,, | Performed by: STUDENT IN AN ORGANIZED HEALTH CARE EDUCATION/TRAINING PROGRAM

## 2023-11-17 PROCEDURE — 1159F PR MEDICATION LIST DOCUMENTED IN MEDICAL RECORD: ICD-10-PCS | Mod: CPTII,S$GLB,, | Performed by: STUDENT IN AN ORGANIZED HEALTH CARE EDUCATION/TRAINING PROGRAM

## 2023-11-17 PROCEDURE — 1160F PR REVIEW ALL MEDS BY PRESCRIBER/CLIN PHARMACIST DOCUMENTED: ICD-10-PCS | Mod: CPTII,S$GLB,, | Performed by: STUDENT IN AN ORGANIZED HEALTH CARE EDUCATION/TRAINING PROGRAM

## 2023-11-17 PROCEDURE — 90471 TDAP VACCINE GREATER THAN OR EQUAL TO 7YO IM: ICD-10-PCS | Mod: S$GLB,,, | Performed by: STUDENT IN AN ORGANIZED HEALTH CARE EDUCATION/TRAINING PROGRAM

## 2023-11-17 PROCEDURE — 99213 PR OFFICE/OUTPT VISIT, EST, LEVL III, 20-29 MIN: ICD-10-PCS | Mod: 25,S$GLB,, | Performed by: STUDENT IN AN ORGANIZED HEALTH CARE EDUCATION/TRAINING PROGRAM

## 2023-11-17 PROCEDURE — 1160F RVW MEDS BY RX/DR IN RCRD: CPT | Mod: CPTII,S$GLB,, | Performed by: STUDENT IN AN ORGANIZED HEALTH CARE EDUCATION/TRAINING PROGRAM

## 2023-11-17 PROCEDURE — 90715 TDAP VACCINE 7 YRS/> IM: CPT | Mod: S$GLB,,, | Performed by: STUDENT IN AN ORGANIZED HEALTH CARE EDUCATION/TRAINING PROGRAM

## 2023-11-17 PROCEDURE — 99999 PR PBB SHADOW E&M-EST. PATIENT-LVL IV: ICD-10-PCS | Mod: PBBFAC,,, | Performed by: STUDENT IN AN ORGANIZED HEALTH CARE EDUCATION/TRAINING PROGRAM

## 2023-11-17 PROCEDURE — 3008F PR BODY MASS INDEX (BMI) DOCUMENTED: ICD-10-PCS | Mod: CPTII,S$GLB,, | Performed by: STUDENT IN AN ORGANIZED HEALTH CARE EDUCATION/TRAINING PROGRAM

## 2023-11-17 PROCEDURE — 90715 TDAP VACCINE GREATER THAN OR EQUAL TO 7YO IM: ICD-10-PCS | Mod: S$GLB,,, | Performed by: STUDENT IN AN ORGANIZED HEALTH CARE EDUCATION/TRAINING PROGRAM

## 2023-11-17 PROCEDURE — 3075F PR MOST RECENT SYSTOLIC BLOOD PRESS GE 130-139MM HG: ICD-10-PCS | Mod: CPTII,S$GLB,, | Performed by: STUDENT IN AN ORGANIZED HEALTH CARE EDUCATION/TRAINING PROGRAM

## 2023-11-17 PROCEDURE — 3079F DIAST BP 80-89 MM HG: CPT | Mod: CPTII,S$GLB,, | Performed by: STUDENT IN AN ORGANIZED HEALTH CARE EDUCATION/TRAINING PROGRAM

## 2023-11-17 PROCEDURE — 1159F MED LIST DOCD IN RCRD: CPT | Mod: CPTII,S$GLB,, | Performed by: STUDENT IN AN ORGANIZED HEALTH CARE EDUCATION/TRAINING PROGRAM

## 2023-11-17 RX ORDER — METRONIDAZOLE 7.5 MG/G
CREAM TOPICAL
Qty: 45 G | Refills: 11 | Status: SHIPPED | OUTPATIENT
Start: 2023-11-17 | End: 2024-03-12

## 2023-11-17 NOTE — PATIENT INSTRUCTIONS
Recommend a gentle cleanser in AM and PM, can alternative benzoyl peroxide w/ nighttime  a few times a week as tolerated, nighttime moisturizer, and daily sunscreen.

## 2023-11-17 NOTE — PROGRESS NOTES
Plan:     Grover was seen today for [facility] care.    Diagnoses and all orders for this visit:    Preventative health care  Discussed age appropriate preventative healthcare items such as cancer screenings and recommended immunizations. Discussed whether patient is using tobacco, alcohol, or illicit drugs and any concerns were discussed. Discussed maintenance of a healthy weight. Patient queried if he has any additional questions about preventative healthcare and all questions were answered.   -     Hepatitis C Antibody; Future  -     HIV 1/2 Ag/Ab (4th Gen); Future  -     Hemoglobin A1C; Future  -     Lipid Panel; Future  -     Comprehensive Metabolic Panel; Future  -     CBC Auto Differential; Future    Encounter for screening for diabetes mellitus  -     Hemoglobin A1C; Future  -     Comprehensive Metabolic Panel; Future    Encounter for screening for lipid disorder  -     Lipid Panel; Future    Encounter for hepatitis C screening test for low risk patient  -     Hepatitis C Antibody; Future    Encounter for screening for human immunodeficiency virus (HIV)  -     HIV 1/2 Ag/Ab (4th Gen); Future    Need for Tdap vaccination  -     (In Office Administered) Tdap Vaccine    Rosacea: Recommend a gentle cleanser in AM and PM, can alternative benzoyl peroxide w/ nighttime  a few times a week as tolerated, nighttime moisturizer, and daily sunscreen.  -     metronidazole 0.75% (METROCREAM) 0.75 % Crea; Apply and rub a thin film twice daily, morning and evening, to entire affected areas after washing.    Vasectomy evaluation  -     Ambulatory referral/consult to Urology; Future       Follow up in about 4 weeks (around 12/15/2023), or if symptoms worsen or fail to improve.    Edith Felix MD  11/17/2023    Subjective:      Patient ID: Grover Abbasi III is a 33 y.o. male    Chief Complaint   Patient presents with    [facility] Care     Est care       HPI  33 y.o. male with a PMHx as documented below presents  "to clinic today for the following:    Annual exam, no daily medications. Requests referral to Urology for vasectomy eval. Looking for topical treatment options for rosacea.     ROS  Constitutional:  Negative for chills and fever.   Respiratory:  Negative for shortness of breath.    Cardiovascular:  Negative for chest pain.   Gastrointestinal:  Negative for abdominal pain, constipation, diarrhea, nausea and vomiting.     Current Outpatient Medications   Medication Instructions    etodolac (LODINE) 400 mg, Oral, 2 times daily    LIDOcaine 4 % PtMd Apply 1 patch topically to affected area 2 x per day    loratadine (CLARITIN) 10 mg, Oral, Daily PRN    metronidazole 0.75% (METROCREAM) 0.75 % Crea Apply and rub a thin film twice daily, morning and evening, to entire affected areas after washing.      Past Medical History:   Diagnosis Date    Rosacea 11/24/2023     History reviewed. No pertinent surgical history.    Review of patient's allergies indicates:   Allergen Reactions    Amoxicillin      Family History   Problem Relation Age of Onset    Hearing loss Mother      Social History     Tobacco Use    Smoking status: Never    Smokeless tobacco: Never   Substance Use Topics    Alcohol use: Yes    Drug use: Never     Currently on File with [facility]:   Most Recent Immunizations   Administered Date(s) Administered    Tdap 11/17/2023     Objective:      Vitals:    11/17/23 1431   BP: 130/84   Weight: 69.6 kg (153 lb 5.3 oz)   Height: 5' 6" (1.676 m)     Body mass index is 24.75 kg/m².    Physical Exam   Constitutional:       General: No acute distress.  HENT:      Head: Normocephalic and atraumatic.   Pulmonary:      Effort: Pulmonary effort is normal. No respiratory distress.   Neurological:      General: No focal deficit present.      Mental Status: Alert and oriented to person, place, and time. Mental status is at baseline.    Assessment:       1. Preventative health care    2. Encounter for screening for diabetes " mellitus    3. Encounter for screening for lipid disorder    4. Encounter for hepatitis C screening test for low risk patient    5. Encounter for screening for human immunodeficiency virus (HIV)    6. Need for Tdap vaccination    7. Rosacea    8. Vasectomy evaluation        Edith Felix MD  [facility]  Office: [phone]   Fax: [phone]  11/17/2023      Disclaimer: This note was partly generated using dictation software which may occasionally result in transcription errors.    Total time spent on this encounter includes face to face time and non-face to face time preparing to see the patient (eg, review of tests), obtaining and/or reviewing separately obtained history, documenting clinical information in the electronic or other health record, independently interpreting results, and communicating results to the patient/family/caregiver, or care coordinator.

## 2023-11-24 PROBLEM — L71.9 ROSACEA: Status: ACTIVE | Noted: 2023-11-24

## 2023-11-24 PROBLEM — L71.9 ROSACEA: Chronic | Status: ACTIVE | Noted: 2023-11-24

## 2023-11-30 ENCOUNTER — OFFICE VISIT (OUTPATIENT)
Dept: URGENT CARE | Facility: CLINIC | Age: 33
End: 2023-11-30
Payer: OTHER MISCELLANEOUS

## 2023-11-30 VITALS
HEART RATE: 88 BPM | OXYGEN SATURATION: 98 % | BODY MASS INDEX: 24.59 KG/M2 | SYSTOLIC BLOOD PRESSURE: 125 MMHG | TEMPERATURE: 98 F | DIASTOLIC BLOOD PRESSURE: 77 MMHG | WEIGHT: 153 LBS | HEIGHT: 66 IN

## 2023-11-30 DIAGNOSIS — Z02.6 ENCOUNTER RELATED TO WORKER'S COMPENSATION CLAIM: Primary | ICD-10-CM

## 2023-11-30 DIAGNOSIS — M54.41 ACUTE BILATERAL LOW BACK PAIN WITH RIGHT-SIDED SCIATICA: ICD-10-CM

## 2023-11-30 DIAGNOSIS — S39.012D LUMBAR SPINE STRAIN, SUBSEQUENT ENCOUNTER: ICD-10-CM

## 2023-11-30 PROCEDURE — 99212 OFFICE O/P EST SF 10 MIN: CPT | Mod: S$GLB,,, | Performed by: SURGERY

## 2023-11-30 PROCEDURE — 99212 PR OFFICE/OUTPT VISIT, EST, LEVL II, 10-19 MIN: ICD-10-PCS | Mod: S$GLB,,, | Performed by: SURGERY

## 2023-11-30 NOTE — LETTER
[facility]  [address] [address]  Phone: [phone]  Fax: [phone]  [facility]: [email or web address]    Pt Name: Grover Abbasi III  Injury Date: 10/27/2023   Employee ID: [id number] Date of Treatment: 11/30/2023   Company: [employer]      Appointment Time: 02:00 PM Arrived: 1:42 PM   Provider: Maria Del Carmen Billingsley MD Time Out: 2:29 PM      Office Treatment:   1. Encounter related to worker's compensation claim    2. Lumbar spine strain, subsequent encounter    3. Acute bilateral low back pain with right-sided sciatica          Patient Instructions: Begin Physical Therapy, PT to be scheduled once authorized      Restrictions: No lifting/pushing/pulling more than 25 lbs, Avoid frequent bending/lifting/twisting     Return Appointment: 12/14/2023 at 2:00 PM JANNIE

## 2023-11-30 NOTE — PROGRESS NOTES
Subjective:      Patient ID: Grover Abbasi III is a 33 y.o. male.    Chief Complaint: Back Pain    Patient's place of employment - [employer]   Patient's job title - HVAC Tech  Date of Injury - 10/27/23  Body part injured - Lower Back   Current work status per last visit - Light Duty   Improved, same, or worse - Improved   Pain Scale right now (1-10) - 3/10    Back Pain  Associated symptoms include numbness.       Constitution: Positive for activity change. Negative for generalized weakness.   Neck: Negative for neck pain and neck stiffness.   Musculoskeletal:  Positive for pain, abnormal ROM of joint and back pain. Negative for joint pain, joint swelling, pain with walking, muscle cramps and muscle ache.   Neurological:  Positive for numbness and tingling. Negative for loss of balance.   Psychiatric/Behavioral:  Negative for sleep disturbance.      See MA note above. Begin MD note:    Grover Abbasi III is a 33 y.o. male presenting for follow-up of low back pain.  He is noticed some improvement since last time, numbness is still there but only with prolonged sitting.  He gets shooting pain down the but with bending forward and rising from sitting.  He finished the muscle relaxer last week, but continues to use NSAID.  He notes that the pain is constant throughout the day, but he is not limping.  Continues to work light duty, getting assistance for any activities outside of his work restrictions. Company had not filed WC claim prior, he is waiting to receive information now.     Objective:      Physical Exam  Vitals and nursing note reviewed.   HENT:      Head: Normocephalic.   Eyes:      Conjunctiva/sclera: Conjunctivae normal.   Pulmonary:      Effort: Pulmonary effort is normal.   Musculoskeletal:      Lumbar back: Spasms and tenderness present. Decreased range of motion.        Back:       Comments: Able to rise from sitting without difficulty or assistance. Decreased FF, FAROM in all other planes with  report of right sided pain with lateral flexion bilaterally.    Skin:     Findings: No erythema.   Neurological:      General: No focal deficit present.      Mental Status: He is alert and oriented to person, place, and time.      Motor: Motor function is intact.      Coordination: Coordination is intact.   Psychiatric:         Attention and Perception: Attention normal.         Mood and Affect: Mood normal.         Speech: Speech normal.         Behavior: Behavior normal. Behavior is cooperative.         Thought Content: Thought content normal.    Assessment:      1. Encounter related to worker's compensation claim    2. Lumbar spine strain, subsequent encounter    3. Acute bilateral low back pain with right-sided sciatica      Plan:     Minor interval improvements. Start PT, wean NSAID use as able. If no improvements after initiation of PT will order MRI to assess for vertebral disc dysfunction. Follow-up in 2 weeks.     Diagnoses and plan discussed with the patient, as well as the expected course and duration of symptoms. Risks and benefits of any medication prescribed during this visit was explained, verbal instructions on use given. Clinic/Emergency department return precautions were given, can return to [facility] before scheduled follow-up appointment if notes worsening/aggravation of symptoms. All questions and concerns were addressed prior to discharge. Plan was developed with active input from the patient and they verbalized understanding of and agreement with the POC.   Note was dictated with voice recognition software, please excuse any grammatical errors.     Patient Instructions: Begin Physical Therapy, PT to be scheduled once authorized   Restrictions: No lifting/pushing/pulling more than 25 lbs, Avoid frequent bending/lifting/twisting  Follow up in about 2 weeks (around 12/14/2023).

## 2023-12-11 ENCOUNTER — OFFICE VISIT (OUTPATIENT)
Dept: UROLOGY | Facility: CLINIC | Age: 33
End: 2023-12-11
Payer: COMMERCIAL

## 2023-12-11 VITALS — BODY MASS INDEX: 25.12 KG/M2 | HEIGHT: 66 IN | WEIGHT: 156.31 LBS

## 2023-12-11 DIAGNOSIS — Z30.09 VASECTOMY EVALUATION: ICD-10-CM

## 2023-12-11 DIAGNOSIS — Z30.2 ENCOUNTER FOR STERILIZATION: Primary | ICD-10-CM

## 2023-12-11 LAB
BILIRUBIN, UA POC OHS: NEGATIVE
BLOOD, UA POC OHS: NEGATIVE
CLARITY, UA POC OHS: CLEAR
COLOR, UA POC OHS: YELLOW
GLUCOSE, UA POC OHS: NEGATIVE
KETONES, UA POC OHS: NEGATIVE
LEUKOCYTES, UA POC OHS: NEGATIVE
NITRITE, UA POC OHS: NEGATIVE
PH, UA POC OHS: 7
PROTEIN, UA POC OHS: NEGATIVE
SPECIFIC GRAVITY, UA POC OHS: 1.02
UROBILINOGEN, UA POC OHS: 0.2

## 2023-12-11 PROCEDURE — 81003 POCT URINALYSIS(INSTRUMENT): ICD-10-PCS | Mod: QW,S$GLB,,

## 2023-12-11 PROCEDURE — 1160F RVW MEDS BY RX/DR IN RCRD: CPT | Mod: CPTII,S$GLB,,

## 2023-12-11 PROCEDURE — 99204 PR OFFICE/OUTPT VISIT, NEW, LEVL IV, 45-59 MIN: ICD-10-PCS | Mod: S$GLB,,,

## 2023-12-11 PROCEDURE — 81003 URINALYSIS AUTO W/O SCOPE: CPT | Mod: QW,S$GLB,,

## 2023-12-11 PROCEDURE — 3008F PR BODY MASS INDEX (BMI) DOCUMENTED: ICD-10-PCS | Mod: CPTII,S$GLB,,

## 2023-12-11 PROCEDURE — 3008F BODY MASS INDEX DOCD: CPT | Mod: CPTII,S$GLB,,

## 2023-12-11 PROCEDURE — 1159F PR MEDICATION LIST DOCUMENTED IN MEDICAL RECORD: ICD-10-PCS | Mod: CPTII,S$GLB,,

## 2023-12-11 PROCEDURE — 1160F PR REVIEW ALL MEDS BY PRESCRIBER/CLIN PHARMACIST DOCUMENTED: ICD-10-PCS | Mod: CPTII,S$GLB,,

## 2023-12-11 PROCEDURE — 99999 PR PBB SHADOW E&M-EST. PATIENT-LVL III: CPT | Mod: PBBFAC,,,

## 2023-12-11 PROCEDURE — 1159F MED LIST DOCD IN RCRD: CPT | Mod: CPTII,S$GLB,,

## 2023-12-11 PROCEDURE — 99204 OFFICE O/P NEW MOD 45 MIN: CPT | Mod: S$GLB,,,

## 2023-12-11 PROCEDURE — 99999 PR PBB SHADOW E&M-EST. PATIENT-LVL III: ICD-10-PCS | Mod: PBBFAC,,,

## 2023-12-11 NOTE — PROGRESS NOTES
"[facility] Note  Staff: LD Marie    PCP: MD Isidro    Chief Complaint: Vasectomy Consult    Subjective:        HPI: Grover Abbasi III is a 33 y.o. male NEW PATIENT presents today for vasectomy evaluation. He and his wife desire permanent sterility. They have 2 children together. They do not wish to have any more. He understands that a vasectomy will result in permanent sterility. He states his wife is currently recovering from childbirth. He is healthy and has no urinary complaints. The procedure was explained in detail. All risks including bleeding, infection, hematoma, and failure were discussed. He understands that he will not be immediately sterile and that alternative birth control should be used until azospermia can be confirmed microscopically. Post procedural pain and limitations were discussed. He states he works in [employer] and he will have no problems returning to work the next week. He was given written instructions and all questions answered.     Questions asked the pt during ov today:  Dysuria: No  Gross Hematuria:No  Straining:No, Hesistancy:No, Intermittency:No}, Weak stream:No    Last PSA Screening: No results found for: "PSA", "PSADIAG"    History of Kidney Stones?:  No    Constipation issues?:  No    REVIEW OF SYSTEMS:  Review of Systems   Constitutional: Negative.  Negative for chills and fever.   HENT: Negative.     Eyes: Negative.    Respiratory: Negative.     Cardiovascular: Negative.    Gastrointestinal: Negative.  Negative for abdominal pain, constipation, diarrhea, nausea and vomiting.   Genitourinary: Negative.  Negative for dysuria, flank pain, frequency, hematuria and urgency.   Musculoskeletal: Negative.  Negative for back pain.   Skin: Negative.    Neurological: Negative.    Endo/Heme/Allergies: Negative.    Psychiatric/Behavioral: Negative.         PMHx:  Past Medical History:   Diagnosis Date    Rosacea 11/24/2023       PSHx:  History " reviewed. No pertinent surgical history.    Fam Hx:   malignancies: No    kidney stones: No     Soc Hx:  , lives in [place]    Allergies:  Amoxicillin    Medications: reviewed     Objective:   There were no vitals filed for this visit.    Physical Exam  Constitutional:       Appearance: Normal appearance.   HENT:      Head: Normocephalic.      Mouth/Throat:      Mouth: Mucous membranes are moist.   Eyes:      Conjunctiva/sclera: Conjunctivae normal.   Pulmonary:      Effort: Pulmonary effort is normal.   Abdominal:      General: There is no distension.      Palpations: Abdomen is soft.      Tenderness: There is no abdominal tenderness. There is no right CVA tenderness or left CVA tenderness.   Musculoskeletal:         General: Normal range of motion.      Cervical back: Normal range of motion.   Skin:     General: Skin is warm.   Neurological:      Mental Status: He is alert and oriented to person, place, and time.   Psychiatric:         Mood and Affect: Mood normal.         Behavior: Behavior normal.          EXAM performed by me in office today:  kerry vas palpated  No scrotal rashes, cysts or lesions  Epididymis normal in size, no tenderness  Testes normal and size, equal size bilaterally, no masses  Urethral meatus normal without discharge  Penis is circumcised      Assessment:       1. Encounter for sterilization    2. Vasectomy evaluation          Plan:     Pt scheduled for in office vasectomy at earliest convenience  Written instructions given to patient for pre and post procedural care, pt verbalized understanding    F/u As Needed per Treatment Plan    [facility]: LD Garcia

## 2024-01-08 ENCOUNTER — TELEPHONE (OUTPATIENT)
Dept: UROLOGY | Facility: CLINIC | Age: 34
End: 2024-01-08
Payer: COMMERCIAL

## 2024-01-08 RX ORDER — DIAZEPAM 5 MG/1
5 TABLET ORAL
Qty: 2 TABLET | Refills: 0 | Status: SHIPPED | OUTPATIENT
Start: 2024-01-08

## 2024-01-08 NOTE — TELEPHONE ENCOUNTER
----- Message from Janessa Mendoza RN sent at 1/8/2024 11:08 AM CST -----  Regarding: FW: pt advice  Contact: pt [phone]  Can you please send this in again  ----- Message -----  From: Shadia Titus  Sent: 1/8/2024  11:05 AM CST  To: [facility] Staff  Subject: pt advice                                        Pt calling to request another prescription for his vasectomy. Pt lost prescription he was supposed to take prior to vasectomy. Pls call

## 2024-01-12 ENCOUNTER — OFFICE VISIT (OUTPATIENT)
Dept: UROLOGY | Facility: CLINIC | Age: 34
End: 2024-01-12
Payer: COMMERCIAL

## 2024-01-12 VITALS — HEIGHT: 66 IN | BODY MASS INDEX: 25.15 KG/M2 | WEIGHT: 156.5 LBS

## 2024-01-12 DIAGNOSIS — Z30.2 ENCOUNTER FOR STERILIZATION: Primary | ICD-10-CM

## 2024-01-12 PROCEDURE — 55250 REMOVAL OF SPERM DUCT(S): CPT | Mod: S$GLB,,, | Performed by: STUDENT IN AN ORGANIZED HEALTH CARE EDUCATION/TRAINING PROGRAM

## 2024-01-12 PROCEDURE — 99499 UNLISTED E&M SERVICE: CPT | Mod: S$GLB,,, | Performed by: STUDENT IN AN ORGANIZED HEALTH CARE EDUCATION/TRAINING PROGRAM

## 2024-01-12 PROCEDURE — 99999 PR PBB SHADOW E&M-EST. PATIENT-LVL III: CPT | Mod: PBBFAC,,, | Performed by: STUDENT IN AN ORGANIZED HEALTH CARE EDUCATION/TRAINING PROGRAM

## 2024-01-12 RX ORDER — OXYCODONE AND ACETAMINOPHEN 5; 325 MG/1; MG/1
1 TABLET ORAL EVERY 6 HOURS PRN
Qty: 5 TABLET | Refills: 0 | Status: SHIPPED | OUTPATIENT
Start: 2024-01-12

## 2024-01-12 NOTE — PROCEDURES
Vasectomy    Date/Time: 1/12/2024 8:00 AM    Performed by: Cisco Araujo MD  Authorized by: Selena Singh NP      Pre-procedure diagnosis: Desired infertility    Post-procedure diagnosis: same    Procedure: Bilateral vasectomy    Surgeon: Cisco Araujo MD     Assistant: none     Specimens: none    Complications: none    EBL: minimal    Anesthesia: 1% lidocaine plain    Procedure in detail:  The risks, benefits, and alternatives of the procedure were explained to the patient and informed consent was obtained.    The genitalia was prepped and draped in a sterile fashion. The vas was grasped on the left. 1% lidocaine plain used for local anesthesia. A single midline scrotal incision was made using a scalpel. The vas was delivered, and the adventitia incised isolating the vas. The abdominal end of the vas was cauterized down the lumen with mucosal cautery. The remainder of the vas was then transected with cautery and the testicular end was cauterized down the lumen with mucosal cautery. The testicular end was suture ligated with 2-0 silk. A fascial interposition was then performed using a 3-0 chromic suture. The procedure was then performed on the right in the same fashion. There was no active bleeding. The incision was closed using 3-0 chromic in a horizontal mattress fashion.    The patient tolerated the procedure well with no apparent complications.    Disposition:   He tolerated the procedure well without complication. He was advised to continue contraception until he brings in 2 semen samples that show no sperm. He is also to avoid lifting, strenuous exercise, intercourse, and ASA for 1 week. He will be discharged home in stable condition. He will follow up as needed.

## 2024-03-12 ENCOUNTER — OFFICE VISIT (OUTPATIENT)
Dept: FAMILY MEDICINE | Facility: CLINIC | Age: 34
End: 2024-03-12
Payer: COMMERCIAL

## 2024-03-12 VITALS — BODY MASS INDEX: 25.07 KG/M2 | HEART RATE: 70 BPM | HEIGHT: 66 IN | WEIGHT: 156 LBS | RESPIRATION RATE: 18 BRPM

## 2024-03-12 DIAGNOSIS — R05.1 ACUTE COUGH: ICD-10-CM

## 2024-03-12 DIAGNOSIS — R68.89 FLU-LIKE SYMPTOMS: Primary | ICD-10-CM

## 2024-03-12 DIAGNOSIS — M79.10 MYALGIA: ICD-10-CM

## 2024-03-12 LAB
CTP QC/QA: YES
CTP QC/QA: YES
POC MOLECULAR INFLUENZA A AGN: NEGATIVE
POC MOLECULAR INFLUENZA B AGN: NEGATIVE
SARS-COV-2 RDRP RESP QL NAA+PROBE: NEGATIVE

## 2024-03-12 PROCEDURE — 99213 OFFICE O/P EST LOW 20 MIN: CPT | Mod: S$GLB,,, | Performed by: NURSE PRACTITIONER

## 2024-03-12 PROCEDURE — 3008F BODY MASS INDEX DOCD: CPT | Mod: CPTII,S$GLB,, | Performed by: NURSE PRACTITIONER

## 2024-03-12 PROCEDURE — 87635 SARS-COV-2 COVID-19 AMP PRB: CPT | Mod: QW,S$GLB,, | Performed by: NURSE PRACTITIONER

## 2024-03-12 PROCEDURE — 1159F MED LIST DOCD IN RCRD: CPT | Mod: CPTII,S$GLB,, | Performed by: NURSE PRACTITIONER

## 2024-03-12 PROCEDURE — 1160F RVW MEDS BY RX/DR IN RCRD: CPT | Mod: CPTII,S$GLB,, | Performed by: NURSE PRACTITIONER

## 2024-03-12 PROCEDURE — 87502 INFLUENZA DNA AMP PROBE: CPT | Mod: QW,S$GLB,, | Performed by: NURSE PRACTITIONER

## 2024-03-12 PROCEDURE — 99999 PR PBB SHADOW E&M-EST. PATIENT-LVL III: CPT | Mod: PBBFAC,,, | Performed by: NURSE PRACTITIONER

## 2024-03-12 NOTE — PROGRESS NOTES
THIS DOCUMENT WAS MADE IN PART WITH VOICE RECOGNITION SOFTWARE.  OCCASIONALLY THIS SOFTWARE WILL MISINTERPRET WORDS OR PHRASES.     Assessment and Plan:  .  Grover was seen today for cough.    Diagnoses and all orders for this visit:    Flu-like symptoms  Comments:  Advised on symptomatic treatment.  Obtain some rest  Stay well hydrated.  Advance diet as tolerated    Acute cough  Comments:  He will continue taking DayQuil as needed.  Orders:  -     POCT COVID-19 Rapid Screening  -     POCT Influenza A/B Molecular    Myalgia  Comments:  May take ibuprofen or Tylenol as needed for pain and/or fever.    Overall symptoms mild and patient states improvement.  Advised on symptomatic treatment.  Emergent conditions discussed.  Follow up in about 2 weeks (around 3/26/2024), or if symptoms worsen or fail to improve.   ______________________________________________________________________  Subjective:    Chief Complaint:  Flu-like symptoms    HPI:  Grover is a 34 y.o. year old male here complaining of flu-like symptoms.  Patient complaining of cough, bodyaches chills and slight nausea since Sunday. He reports gradual improvement of symptoms.  Patient states he wants to make sure that he does not have flu or COVID.  He reports spouse and daughter had similar symptoms.  He reports being in the emergency department with his daughter and was possibly exposed to COVID.  Patient taking DayQuil with improvement.  He denies chest pain, shortness for breath and fever.      Medications:  Current Outpatient Medications on File Prior to Visit   Medication Sig Dispense Refill    [DISCONTINUED] diazePAM (VALIUM) 5 MG tablet Take 1 tablet (5 mg total) by mouth as needed for Anxiety (prior to procedure). Take 30-60 minutes prior to procedure (Patient not taking: Reported on 3/12/2024) 2 tablet 0    [DISCONTINUED] etodolac (LODINE) 400 MG tablet Take 1 tablet (400 mg total) by mouth 2 (two) times daily. (Patient not taking: Reported on  "3/12/2024) 60 tablet 0    [DISCONTINUED] LIDOcaine 4 % PtMd Apply 1 patch topically to affected area 2 x per day (Patient not taking: Reported on 3/12/2024) 15 patch 1    [DISCONTINUED] loratadine (CLARITIN) 10 mg tablet Take 1 tablet (10 mg total) by mouth daily as needed for Allergies. (Patient not taking: Reported on 3/12/2024) 30 tablet 1    [DISCONTINUED] metronidazole 0.75% (METROCREAM) 0.75 % Crea Apply and rub a thin film twice daily, morning and evening, to entire affected areas after washing. (Patient not taking: Reported on 3/12/2024) 45 g 11    [DISCONTINUED] oxyCODONE-acetaminophen (PERCOCET) 5-325 mg per tablet Take 1 tablet by mouth every 6 (six) hours as needed for Pain. (Patient not taking: Reported on 3/12/2024) 5 tablet 0     No current facility-administered medications on file prior to visit.       Review of Systems:  Review of Systems   Constitutional:  Positive for chills. Negative for fatigue and fever.   HENT:  Negative for congestion, rhinorrhea and sore throat.    Eyes:  Negative for visual disturbance.   Respiratory:  Positive for cough. Negative for shortness of breath and wheezing.    Cardiovascular:  Negative for chest pain.   Gastrointestinal:  Positive for nausea (slight nausea on Sunday). Negative for abdominal pain, blood in stool, constipation, diarrhea and vomiting.   Musculoskeletal:  Positive for myalgias.   Neurological:  Positive for headaches.       Past Medical History:  Past Medical History:   Diagnosis Date    Rosacea 11/24/2023       Objective:    Vitals:  Vitals:    03/12/24 1433   Pulse: 70   Resp: 18   Weight: 70.8 kg (156 lb)   Height: 5' 6" (1.676 m)   PainSc: 0-No pain       Physical Exam  Constitutional:       General: He is not in acute distress.  HENT:      Head: Normocephalic.      Nose: Nose normal.      Mouth/Throat:      Mouth: Mucous membranes are moist.      Pharynx: Oropharynx is clear.   Eyes:      General: No scleral icterus.  Cardiovascular:      Rate " and Rhythm: Normal rate.   Pulmonary:      Effort: Pulmonary effort is normal. No respiratory distress.   Skin:     General: Skin is dry.   Neurological:      Mental Status: He is alert and oriented to person, place, and time.   Psychiatric:         Mood and Affect: Mood normal.         Behavior: Behavior normal.         Thought Content: Thought content normal.       Patient evaluated in parking lot, following COVID protocol/guidelines.     Data:  POCT COVID:  Negative  POCT flu:  Negative      Medical history reviewed, Medications reconciled.          JOSE DE JESUS HobbsP-C  Family Medicine

## 2024-06-03 ENCOUNTER — TELEPHONE (OUTPATIENT)
Dept: FAMILY MEDICINE | Facility: CLINIC | Age: 34
End: 2024-06-03

## 2024-06-03 ENCOUNTER — E-VISIT (OUTPATIENT)
Dept: FAMILY MEDICINE | Facility: CLINIC | Age: 34
End: 2024-06-03
Payer: COMMERCIAL

## 2024-06-03 DIAGNOSIS — Z71.89 COUNSELING AND COORDINATION OF CARE: Primary | ICD-10-CM

## 2024-06-03 PROCEDURE — 99499 UNLISTED E&M SERVICE: CPT | Mod: ,,, | Performed by: STUDENT IN AN ORGANIZED HEALTH CARE EDUCATION/TRAINING PROGRAM

## 2024-06-03 NOTE — TELEPHONE ENCOUNTER
Called pt to try to get him an in-person visit. Pt said he will call us back to reschedule his appt. I told pt ok and pt hung up.

## 2024-06-03 NOTE — TELEPHONE ENCOUNTER
Patient scheduled an e-visit to be seen for nausea/vomitting/diarrhea. We informed patient he will need an in office visit for his current problems. I called and placed patient on wait list per provider request.

## 2024-06-03 NOTE — PROGRESS NOTES
Counseled on colon cancer screening recommendations. Will need in-person appointment to fully and appropriately evaluate current symptoms and family history of colon cancer.    Edith Felix MD  [facility]  Office: [phone]   Fax: [phone]  06/03/2024    
15-year-old male presents with left shoulder pain.  On exam patient well-appearing in no acute distress.  No tenderness to the left shoulder.  Differentials include fracture vs dislocation versus sprain.  If x-ray negative advised supportive care for likely sprain.

## 2024-06-03 NOTE — Clinical Note
Please reschedule as an in-person visit - virtual/e-visit not appropriate for current concern. Thanks!

## 2024-06-03 NOTE — TELEPHONE ENCOUNTER
----- Message from Edith Felix MD sent at 6/3/2024 10:25 AM CDT -----  Please reschedule as an in-person visit - virtual/e-visit not appropriate for current concern. Thanks!

## 2024-06-03 NOTE — TELEPHONE ENCOUNTER
----- Message from Li Enamorado sent at 6/3/2024 12:22 PM CDT -----  Type:  Patient Returning Call    Who Called:  pt   Who Left Message for Patient:  nurse  Does the patient know what this is regarding?:  yes   Best Call Back Number:  [phone] (home)     Additional Information:  please advise

## 2024-06-07 ENCOUNTER — OFFICE VISIT (OUTPATIENT)
Dept: FAMILY MEDICINE | Facility: CLINIC | Age: 34
End: 2024-06-07
Payer: COMMERCIAL

## 2024-06-07 VITALS
OXYGEN SATURATION: 97 % | HEART RATE: 90 BPM | DIASTOLIC BLOOD PRESSURE: 88 MMHG | RESPIRATION RATE: 18 BRPM | BODY MASS INDEX: 24.39 KG/M2 | SYSTOLIC BLOOD PRESSURE: 136 MMHG | WEIGHT: 151.13 LBS

## 2024-06-07 DIAGNOSIS — R63.4 WEIGHT LOSS: ICD-10-CM

## 2024-06-07 DIAGNOSIS — Z13.220 ENCOUNTER FOR SCREENING FOR LIPID DISORDER: ICD-10-CM

## 2024-06-07 DIAGNOSIS — K90.49 DAIRY PRODUCT INTOLERANCE: ICD-10-CM

## 2024-06-07 DIAGNOSIS — R50.9 FEVER AND CHILLS: ICD-10-CM

## 2024-06-07 DIAGNOSIS — Z13.1 ENCOUNTER FOR SCREENING FOR DIABETES MELLITUS: ICD-10-CM

## 2024-06-07 DIAGNOSIS — R19.7 DIARRHEA OF PRESUMED INFECTIOUS ORIGIN: Primary | ICD-10-CM

## 2024-06-07 DIAGNOSIS — J02.9 SORE THROAT: ICD-10-CM

## 2024-06-07 DIAGNOSIS — Z80.0 FAMILY HISTORY OF COLON CANCER: ICD-10-CM

## 2024-06-07 PROCEDURE — 99215 OFFICE O/P EST HI 40 MIN: CPT | Mod: S$GLB,,, | Performed by: STUDENT IN AN ORGANIZED HEALTH CARE EDUCATION/TRAINING PROGRAM

## 2024-06-07 PROCEDURE — 1159F MED LIST DOCD IN RCRD: CPT | Mod: CPTII,S$GLB,, | Performed by: STUDENT IN AN ORGANIZED HEALTH CARE EDUCATION/TRAINING PROGRAM

## 2024-06-07 PROCEDURE — 99999 PR PBB SHADOW E&M-EST. PATIENT-LVL III: CPT | Mod: PBBFAC,,, | Performed by: STUDENT IN AN ORGANIZED HEALTH CARE EDUCATION/TRAINING PROGRAM

## 2024-06-07 PROCEDURE — 3075F SYST BP GE 130 - 139MM HG: CPT | Mod: CPTII,S$GLB,, | Performed by: STUDENT IN AN ORGANIZED HEALTH CARE EDUCATION/TRAINING PROGRAM

## 2024-06-07 PROCEDURE — 87502 INFLUENZA DNA AMP PROBE: CPT | Mod: QW,S$GLB,, | Performed by: STUDENT IN AN ORGANIZED HEALTH CARE EDUCATION/TRAINING PROGRAM

## 2024-06-07 PROCEDURE — 3008F BODY MASS INDEX DOCD: CPT | Mod: CPTII,S$GLB,, | Performed by: STUDENT IN AN ORGANIZED HEALTH CARE EDUCATION/TRAINING PROGRAM

## 2024-06-07 PROCEDURE — 1160F RVW MEDS BY RX/DR IN RCRD: CPT | Mod: CPTII,S$GLB,, | Performed by: STUDENT IN AN ORGANIZED HEALTH CARE EDUCATION/TRAINING PROGRAM

## 2024-06-07 PROCEDURE — 87635 SARS-COV-2 COVID-19 AMP PRB: CPT | Mod: QW,S$GLB,, | Performed by: STUDENT IN AN ORGANIZED HEALTH CARE EDUCATION/TRAINING PROGRAM

## 2024-06-07 PROCEDURE — 3079F DIAST BP 80-89 MM HG: CPT | Mod: CPTII,S$GLB,, | Performed by: STUDENT IN AN ORGANIZED HEALTH CARE EDUCATION/TRAINING PROGRAM

## 2024-06-07 RX ORDER — LEVOFLOXACIN 750 MG/1
750 TABLET ORAL DAILY
Qty: 7 TABLET | Refills: 0 | Status: SHIPPED | OUTPATIENT
Start: 2024-06-07 | End: 2024-06-14

## 2024-06-07 NOTE — PROGRESS NOTES
Plan:      Grover was seen today for nausea.    Diagnoses and all orders for this visit:    Diarrhea of presumed infectious origin: Concern for Salmonella given symptoms occurring in setting of exposure to unwashed backyard chicken eggs and handling turtles. Pt prefers to wait until stool studies result for treatment. Levaquin sent to pharmacy in case of pt wanting to start treatment earlier for empiric treatment.   -     Comprehensive Metabolic Panel; Future  -     CBC Auto Differential; Future  -     TSH; Future  -     Magnesium; Future  -     H. pylori antigen, stool; Future  -     Pancreatic elastase, fecal; Future  -     Fecal fat, qualitative; Future  -     Occult blood x 1, stool; Future  -     WBC, Stool; Future  -     Rotavirus antigen, stool; Future  -     Adenovirus Molecular Detection, PCR, Non-Blood Stool; Future  -     Calprotectin, Stool; Future  -     Giardia / Cryptosporidum, EIA; Future  -     Clostridium difficile EIA; Future  -     Stool culture; Future  -     levoFLOXacin (LEVAQUIN) 750 MG tablet; Take 1 tablet (750 mg total) by mouth once daily. for 7 days    Fever and chills  -     Comprehensive Metabolic Panel; Future  -     CBC Auto Differential; Future  -     TSH; Future  -     Magnesium; Future  -     ALLERGEN MILK; Future  -     H. pylori antigen, stool; Future  -     Pancreatic elastase, fecal; Future  -     Fecal fat, qualitative; Future  -     Occult blood x 1, stool; Future  -     WBC, Stool; Future  -     Rotavirus antigen, stool; Future  -     Adenovirus Molecular Detection, PCR, Non-Blood Stool; Future  -     Calprotectin, Stool; Future  -     Giardia / Cryptosporidum, EIA; Future  -     Clostridium difficile EIA; Future  -     Stool culture; Future  -     POCT Influenza A/B Molecular  -     POCT COVID-19 Rapid Screening; Future  -     POCT COVID-19 Rapid Screening    Sore throat  -     POCT Influenza A/B Molecular  -     POCT COVID-19 Rapid Screening; Future  -     POCT COVID-19  Rapid Screening    Encounter for screening for diabetes mellitus  -     Hemoglobin A1C; Future  -     Comprehensive Metabolic Panel; Future    Encounter for screening for lipid disorder  -     Lipid Panel; Future    Family history of colon cancer  -     Ambulatory referral/consult to Gastroenterology; Future    Weight loss  -     Ambulatory referral/consult to Gastroenterology; Future    Dairy product intolerance  -     ALLERGEN MILK; Future      Follow up if symptoms worsen or fail to improve.    Edith Felix MD  06/07/2024    Subjective:      Patient ID: Grover Abbasi III is a 34 y.o. male    Chief Complaint   Patient presents with    Nausea     Nausea throughout the day for about a year, snack make it better, lost about 7lbs this month, diarrhea x2 weeks, fever for 3 days now     HPI  34 y.o. male with a PMHx as documented below presents to clinic today for the following:    Patient reports 2-3 week history of diarrhea.  Patient describes diarrhea as watery stool without blood or mucus.  Associated symptoms include bilateral lower abdominal pain and intermittent nausea.  Last episode of diarrhea occurred this past weekend (about 5 days ago).  Patient reports starting with new onset fever on Monday (about 4 days ago) w/ Tmax 102F.  Last fever occurred yesterday evening.  Patient reports taking Tylenol over the past several days including this morning.  Associated symptoms since onset of fever have included headaches and mild sore throat/cough this morning (resolved at time of today's appointment, mid-morning). No known sick contacts. Young kids at home, but both have been out of school/ for a month and have been asymptomatic.     Patient reports 7-8 lb weight loss since February 20, 2024.  He does endorse recent dietary changes including no longer having gas station taquitos and a monster energy drink for breakfast.  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     Patient  reports childhood history of intermittent stomach pain and diarrhea.  He remembers having to go straight home after going out to eat with his family due to upset stomach.  Patient states that this resolved in his early 20s.    Patient states he thinks he may have a dairy intolerance - would like to add to list of labs today.    Exposures occurring within the past month include backyard chicken eggs and a few turtles (moving them out of the road).  Patient states that he did not always wash his hands after these exposures and that he does touch his mustache often throughout the day.    Family history positive for colon cancer, IBS, diverticulitis, and gastric ulcers.     Review of Systems   Constitutional:  Positive for chills, fever, malaise/fatigue and weight loss.   HENT:  Positive for sore throat.    Respiratory:  Positive for cough. Negative for shortness of breath.    Cardiovascular:  Negative for chest pain.   Gastrointestinal:  Positive for abdominal pain, diarrhea and nausea. Negative for blood in stool, constipation, melena and vomiting.   Genitourinary:  Negative for dysuria, flank pain, frequency, hematuria and urgency.   Musculoskeletal:  Negative for back pain and neck pain.   Skin:  Negative for rash.   Neurological:  Positive for headaches. Negative for dizziness, sensory change and weakness.   Psychiatric/Behavioral:  Negative for depression. The patient is not nervous/anxious.      Past Medical History:   Diagnosis Date    Rosacea 11/24/2023      Objective:      Vitals:    06/07/24 0857   BP: 136/88   BP Location: Right arm   Patient Position: Sitting   Pulse: 90   Resp: 18   SpO2: 97%   Weight: 68.6 kg (151 lb 2 oz)     Body mass index is 24.39 kg/m².    Physical Exam  Vitals reviewed.   Constitutional:       General: He is not in acute distress.     Appearance: He is not ill-appearing, toxic-appearing or diaphoretic.   HENT:      Head: Normocephalic and atraumatic.   Cardiovascular:      Rate and  Rhythm: Normal rate.   Pulmonary:      Effort: Pulmonary effort is normal. No respiratory distress.   Abdominal:      General: There is no distension.      Palpations: Abdomen is soft. There is no mass.      Tenderness: There is abdominal tenderness (RLQ, mild w/ deep palpation). There is no guarding or rebound.   Neurological:      General: No focal deficit present.      Mental Status: He is alert and oriented to person, place, and time. Mental status is at baseline.        Assessment:       1. Diarrhea of presumed infectious origin    2. Fever and chills    3. Sore throat    4. Encounter for screening for diabetes mellitus    5. Encounter for screening for lipid disorder    6. Family history of colon cancer    7. Weight loss    8. Dairy product intolerance        Edith Felix MD  [facility]  Office: [phone]   Fax: [phone]  06/07/2024      Disclaimer: This note was partly generated using dictation software which may occasionally result in transcription errors.    Total time spend on encounter: 40-54 minutes. This includes face to face time and non-face to face time preparing to see the patient (eg, review of tests), obtaining and/or reviewing separately obtained history, documenting clinical information in the electronic or other health record, independently interpreting results and communicating results to the patient/family/caregiver, or care coordinator.

## 2024-06-10 ENCOUNTER — LAB VISIT (OUTPATIENT)
Dept: LAB | Facility: HOSPITAL | Age: 34
End: 2024-06-10
Attending: STUDENT IN AN ORGANIZED HEALTH CARE EDUCATION/TRAINING PROGRAM
Payer: COMMERCIAL

## 2024-06-10 DIAGNOSIS — R19.7 DIARRHEA OF PRESUMED INFECTIOUS ORIGIN: ICD-10-CM

## 2024-06-10 DIAGNOSIS — Z13.1 ENCOUNTER FOR SCREENING FOR DIABETES MELLITUS: ICD-10-CM

## 2024-06-10 DIAGNOSIS — Z13.220 ENCOUNTER FOR SCREENING FOR LIPID DISORDER: ICD-10-CM

## 2024-06-10 DIAGNOSIS — R50.9 FEVER AND CHILLS: ICD-10-CM

## 2024-06-10 LAB
ALBUMIN SERPL BCP-MCNC: 4 G/DL (ref 3.5–5.2)
ALP SERPL-CCNC: 74 U/L (ref 55–135)
ALT SERPL W/O P-5'-P-CCNC: 16 U/L (ref 10–44)
ANION GAP SERPL CALC-SCNC: 13 MMOL/L (ref 8–16)
ANISOCYTOSIS BLD QL SMEAR: SLIGHT
AST SERPL-CCNC: 20 U/L (ref 10–40)
BASOPHILS # BLD AUTO: 0.02 K/UL (ref 0–0.2)
BASOPHILS NFR BLD: 0.8 % (ref 0–1.9)
BILIRUB SERPL-MCNC: 0.8 MG/DL (ref 0.1–1)
BUN SERPL-MCNC: 15 MG/DL (ref 6–20)
BURR CELLS BLD QL SMEAR: ABNORMAL
CALCIUM SERPL-MCNC: 10.3 MG/DL (ref 8.7–10.5)
CHLORIDE SERPL-SCNC: 102 MMOL/L (ref 95–110)
CHOLEST SERPL-MCNC: 134 MG/DL (ref 120–199)
CHOLEST/HDLC SERPL: 3.8 {RATIO} (ref 2–5)
CO2 SERPL-SCNC: 24 MMOL/L (ref 23–29)
CREAT SERPL-MCNC: 1 MG/DL (ref 0.5–1.4)
DIFFERENTIAL METHOD BLD: ABNORMAL
EOSINOPHIL # BLD AUTO: 0.2 K/UL (ref 0–0.5)
EOSINOPHIL NFR BLD: 7.6 % (ref 0–8)
ERYTHROCYTE [DISTWIDTH] IN BLOOD BY AUTOMATED COUNT: 12.6 % (ref 11.5–14.5)
EST. GFR  (NO RACE VARIABLE): >60 ML/MIN/1.73 M^2
ESTIMATED AVG GLUCOSE: 105 MG/DL (ref 68–131)
GLUCOSE SERPL-MCNC: 91 MG/DL (ref 70–110)
HBA1C MFR BLD: 5.3 % (ref 4–5.6)
HCT VFR BLD AUTO: 41.4 % (ref 40–54)
HDLC SERPL-MCNC: 35 MG/DL (ref 40–75)
HDLC SERPL: 26.1 % (ref 20–50)
HGB BLD-MCNC: 14.1 G/DL (ref 14–18)
IMM GRANULOCYTES # BLD AUTO: 0 K/UL (ref 0–0.04)
IMM GRANULOCYTES NFR BLD AUTO: 0 % (ref 0–0.5)
LDLC SERPL CALC-MCNC: 84.6 MG/DL (ref 63–159)
LYMPHOCYTES # BLD AUTO: 1.4 K/UL (ref 1–4.8)
LYMPHOCYTES NFR BLD: 57 % (ref 18–48)
MAGNESIUM SERPL-MCNC: 2.2 MG/DL (ref 1.6–2.6)
MCH RBC QN AUTO: 32 PG (ref 27–31)
MCHC RBC AUTO-ENTMCNC: 34.1 G/DL (ref 32–36)
MCV RBC AUTO: 94 FL (ref 82–98)
MONOCYTES # BLD AUTO: 0.3 K/UL (ref 0.3–1)
MONOCYTES NFR BLD: 13.1 % (ref 4–15)
NEUTROPHILS # BLD AUTO: 0.5 K/UL (ref 1.8–7.7)
NEUTROPHILS NFR BLD: 21.5 % (ref 38–73)
NONHDLC SERPL-MCNC: 99 MG/DL
NRBC BLD-RTO: 0 /100 WBC
PLATELET # BLD AUTO: 279 K/UL (ref 150–450)
PLATELET BLD QL SMEAR: ABNORMAL
PMV BLD AUTO: 10 FL (ref 9.2–12.9)
POIKILOCYTOSIS BLD QL SMEAR: SLIGHT
POLYCHROMASIA BLD QL SMEAR: ABNORMAL
POTASSIUM SERPL-SCNC: 5 MMOL/L (ref 3.5–5.1)
PROT SERPL-MCNC: 7.4 G/DL (ref 6–8.4)
RBC # BLD AUTO: 4.4 M/UL (ref 4.6–6.2)
SODIUM SERPL-SCNC: 139 MMOL/L (ref 136–145)
SPHEROCYTES BLD QL SMEAR: ABNORMAL
TRIGL SERPL-MCNC: 72 MG/DL (ref 30–150)
TSH SERPL DL<=0.005 MIU/L-ACNC: 0.64 UIU/ML (ref 0.4–4)
WBC # BLD AUTO: 2.37 K/UL (ref 3.9–12.7)

## 2024-06-10 PROCEDURE — 85025 COMPLETE CBC W/AUTO DIFF WBC: CPT | Performed by: STUDENT IN AN ORGANIZED HEALTH CARE EDUCATION/TRAINING PROGRAM

## 2024-06-10 PROCEDURE — 83036 HEMOGLOBIN GLYCOSYLATED A1C: CPT | Performed by: STUDENT IN AN ORGANIZED HEALTH CARE EDUCATION/TRAINING PROGRAM

## 2024-06-10 PROCEDURE — 84443 ASSAY THYROID STIM HORMONE: CPT | Performed by: STUDENT IN AN ORGANIZED HEALTH CARE EDUCATION/TRAINING PROGRAM

## 2024-06-10 PROCEDURE — 80061 LIPID PANEL: CPT | Performed by: STUDENT IN AN ORGANIZED HEALTH CARE EDUCATION/TRAINING PROGRAM

## 2024-06-10 PROCEDURE — 80053 COMPREHEN METABOLIC PANEL: CPT | Performed by: STUDENT IN AN ORGANIZED HEALTH CARE EDUCATION/TRAINING PROGRAM

## 2024-06-10 PROCEDURE — 83735 ASSAY OF MAGNESIUM: CPT | Performed by: STUDENT IN AN ORGANIZED HEALTH CARE EDUCATION/TRAINING PROGRAM

## 2024-06-10 PROCEDURE — 36415 COLL VENOUS BLD VENIPUNCTURE: CPT | Mod: PN | Performed by: STUDENT IN AN ORGANIZED HEALTH CARE EDUCATION/TRAINING PROGRAM

## 2024-08-23 ENCOUNTER — OCCUPATIONAL HEALTH (OUTPATIENT)
Dept: URGENT CARE | Facility: CLINIC | Age: 34
End: 2024-08-23

## 2024-08-23 DIAGNOSIS — Z02.83 ENCOUNTER FOR DRUG SCREENING: Primary | ICD-10-CM

## 2024-08-23 LAB — BREATH ALCOHOL: 0

## 2025-02-20 ENCOUNTER — PATIENT MESSAGE (OUTPATIENT)
Dept: FAMILY MEDICINE | Facility: CLINIC | Age: 35
End: 2025-02-20
Payer: COMMERCIAL

## 2025-02-20 DIAGNOSIS — J11.1 INFLUENZA: Primary | ICD-10-CM

## 2025-02-20 RX ORDER — BALOXAVIR MARBOXIL 40 MG/1
40 TABLET, FILM COATED ORAL ONCE
Qty: 1 TABLET | Refills: 0 | Status: SHIPPED | OUTPATIENT
Start: 2025-02-20 | End: 2025-02-20

## 2025-03-10 ENCOUNTER — OFFICE VISIT (OUTPATIENT)
Dept: FAMILY MEDICINE | Facility: CLINIC | Age: 35
End: 2025-03-10
Payer: COMMERCIAL

## 2025-03-10 VITALS
WEIGHT: 155.44 LBS | BODY MASS INDEX: 25.9 KG/M2 | DIASTOLIC BLOOD PRESSURE: 76 MMHG | OXYGEN SATURATION: 98 % | SYSTOLIC BLOOD PRESSURE: 124 MMHG | HEIGHT: 65 IN | HEART RATE: 82 BPM

## 2025-03-10 DIAGNOSIS — R59.1 LYMPHADENOPATHY: Primary | ICD-10-CM

## 2025-03-10 DIAGNOSIS — R09.82 POSTNASAL DRIP: ICD-10-CM

## 2025-03-10 PROCEDURE — 3008F BODY MASS INDEX DOCD: CPT | Mod: CPTII,S$GLB,,

## 2025-03-10 PROCEDURE — 1159F MED LIST DOCD IN RCRD: CPT | Mod: CPTII,S$GLB,,

## 2025-03-10 PROCEDURE — 3078F DIAST BP <80 MM HG: CPT | Mod: CPTII,S$GLB,,

## 2025-03-10 PROCEDURE — 3074F SYST BP LT 130 MM HG: CPT | Mod: CPTII,S$GLB,,

## 2025-03-10 PROCEDURE — 99999 PR PBB SHADOW E&M-EST. PATIENT-LVL III: CPT | Mod: PBBFAC,,,

## 2025-03-10 PROCEDURE — 99214 OFFICE O/P EST MOD 30 MIN: CPT | Mod: S$GLB,,,

## 2025-03-10 RX ORDER — IPRATROPIUM BROMIDE 21 UG/1
2 SPRAY, METERED NASAL 2 TIMES DAILY
Qty: 30 ML | Refills: 0 | Status: SHIPPED | OUTPATIENT
Start: 2025-03-10

## 2025-03-10 RX ORDER — IBUPROFEN 800 MG/1
800 TABLET ORAL 3 TIMES DAILY
COMMUNITY
Start: 2025-02-26

## 2025-03-10 NOTE — PROGRESS NOTES
"[facility]  [address] [place] [address]    Subjective    Chief Complaint:   Chief Complaint   Patient presents with    Mass     Pt is coming in today for a lump on the left side of his neck, noticed it last night.       History of Present Illness:     Grover Abbasi III is a(n) 35 y.o. male with past medical history as noted below who presents to the clinic today for painless neck mass.    1 day onset painless left-sided neck mass. He had influenza last week and is still having a bit of postnasal drip, but otherwise no ongoing symptoms. His mother has had 2 types of lymphoma and patient is worried about this.          Problem List: Problem List[1]    Current Outpatient Medications:   Current Outpatient Medications   Medication Instructions    ibuprofen (ADVIL,MOTRIN) 800 mg, 3 times daily       Surgical History: No past surgical history on file.    Family History:   Family History   Problem Relation Name Age of Onset    Hearing loss Mother         Allergies:   Review of patient's allergies indicates:   Allergen Reactions    Amoxicillin        Tobacco Status:   Tobacco Use: Low Risk  (3/10/2025)    Patient History     Smoking Tobacco Use: Never     Smokeless Tobacco Use: Never     Passive Exposure: Not on file       Sexual Activity:   Social History     Substance and Sexual Activity   Sexual Activity Yes       Alcohol Use:   Social History     Substance and Sexual Activity   Alcohol Use Yes         Objective       Vitals:    03/10/25 1428   BP: 124/76   Pulse: 82   SpO2: 98%   Weight: 70.5 kg (155 lb 6.8 oz)   Height: 5' 5" (1.651 m)       Review of Systems   Constitutional:  Negative for chills and fever.   HENT:  Positive for congestion.    Respiratory:  Negative for cough and shortness of breath.    Cardiovascular:  Negative for chest pain.       Physical Exam  Constitutional:       General: He is not in acute distress.     Appearance: Normal " appearance.   HENT:      Head: Normocephalic and atraumatic.      Right Ear: Tympanic membrane, ear canal and external ear normal.      Left Ear: Tympanic membrane, ear canal and external ear normal.      Nose: Congestion present.      Mouth/Throat:      Mouth: Mucous membranes are moist.   Cardiovascular:      Rate and Rhythm: Normal rate and regular rhythm.      Heart sounds: Normal heart sounds. No murmur heard.  Pulmonary:      Effort: Pulmonary effort is normal. No respiratory distress.      Breath sounds: Normal breath sounds. No wheezing.   Lymphadenopathy:      Cervical: Cervical adenopathy present.      Left cervical: Posterior cervical adenopathy (non-tender, approx 1 cm, mobile, rubbery mass palpated) present.   Skin:     General: Skin is warm.   Neurological:      Mental Status: He is alert and oriented to person, place, and time.   Psychiatric:         Behavior: Behavior normal.         Assessment and Plan:    1. Lymphadenopathy  -     Cancel: US Soft Tissue Head Neck; Future; Expected date: 03/10/2025  -     CBC Auto Differential; Future; Expected date: 03/10/2025  -     Comprehensive Metabolic Panel; Future; Expected date: 03/10/2025  -     US Soft Tissue Head Neck; Future; Expected date: 03/10/2025    2. Postnasal drip  -     ipratropium (ATROVENT) 21 mcg (0.03 %) nasal spray; 2 sprays by Each Nostril route 2 (two) times daily.  Dispense: 30 mL; Refill: 0        Visit summary:    Grover Abbasi III presented today for lymphadenopathy.    Recommend US neck, especially if symptoms persist x 1 week. CBC and CMP as well    Recommend Atrovent nasal spray for post-nasal drip     Follow up: pending imaging results       Estela Cuevas PA-C    This note was created partially with voice dictation software and is prone to errors. This note has been reviewed by me but some errors are inevitable.          [1]   Patient Active Problem List  Diagnosis    Rosacea